# Patient Record
Sex: MALE
[De-identification: names, ages, dates, MRNs, and addresses within clinical notes are randomized per-mention and may not be internally consistent; named-entity substitution may affect disease eponyms.]

---

## 2018-04-30 ENCOUNTER — HOSPITAL ENCOUNTER (INPATIENT)
Dept: HOSPITAL 31 - C.ER | Age: 83
LOS: 7 days | Discharge: NURSING, REHAB OR LONG-TERM CARE | DRG: 293 | End: 2018-05-07
Attending: INTERNAL MEDICINE | Admitting: INTERNAL MEDICINE
Payer: COMMERCIAL

## 2018-04-30 DIAGNOSIS — I25.10: ICD-10-CM

## 2018-04-30 DIAGNOSIS — I27.20: ICD-10-CM

## 2018-04-30 DIAGNOSIS — I42.0: ICD-10-CM

## 2018-04-30 DIAGNOSIS — I34.0: ICD-10-CM

## 2018-04-30 DIAGNOSIS — I50.43: ICD-10-CM

## 2018-04-30 DIAGNOSIS — I35.1: ICD-10-CM

## 2018-04-30 DIAGNOSIS — E11.9: ICD-10-CM

## 2018-04-30 DIAGNOSIS — Z95.810: ICD-10-CM

## 2018-04-30 DIAGNOSIS — I11.0: Primary | ICD-10-CM

## 2018-04-30 LAB
ALBUMIN SERPL-MCNC: 3.8 G/DL (ref 3.5–5)
ALBUMIN/GLOB SERPL: 0.9 {RATIO} (ref 1–2.1)
ALT SERPL-CCNC: 7 U/L (ref 21–72)
APTT BLD: 56 SECONDS (ref 21–34)
AST SERPL-CCNC: 25 U/L (ref 17–59)
BASOPHILS # BLD AUTO: 0 K/UL (ref 0–0.2)
BASOPHILS NFR BLD: 0.5 % (ref 0–2)
BNP SERPL-MCNC: (no result) PG/ML (ref 0–900)
BUN SERPL-MCNC: 27 MG/DL (ref 9–20)
CALCIUM SERPL-MCNC: 9.6 MG/DL (ref 8.6–10.4)
CK MB SERPL-MCNC: 0.87 NG/ML (ref 0–3.38)
EOSINOPHIL # BLD AUTO: 0.2 K/UL (ref 0–0.7)
EOSINOPHIL NFR BLD: 2.5 % (ref 0–4)
ERYTHROCYTE [DISTWIDTH] IN BLOOD BY AUTOMATED COUNT: 13.4 % (ref 11.5–14.5)
GFR NON-AFRICAN AMERICAN: 58
HGB BLD-MCNC: 10.9 G/DL (ref 12–18)
INR PPP: 1.5
LYMPHOCYTES # BLD AUTO: 1.1 K/UL (ref 1–4.3)
LYMPHOCYTES NFR BLD AUTO: 11.9 % (ref 20–40)
MCH RBC QN AUTO: 31.4 PG (ref 27–31)
MCHC RBC AUTO-ENTMCNC: 33.9 G/DL (ref 33–37)
MCV RBC AUTO: 92.6 FL (ref 80–94)
MONOCYTES # BLD: 1.2 K/UL (ref 0–0.8)
MONOCYTES NFR BLD: 13.3 % (ref 0–10)
NEUTROPHILS # BLD: 6.7 K/UL (ref 1.8–7)
NEUTROPHILS NFR BLD AUTO: 71.8 % (ref 50–75)
NRBC BLD AUTO-RTO: 0 % (ref 0–2)
PLATELET # BLD: 310 K/UL (ref 130–400)
PMV BLD AUTO: 9.8 FL (ref 7.2–11.7)
PROTHROMBIN TIME: 17.9 SECONDS (ref 9.7–12.2)
RBC # BLD AUTO: 3.47 MIL/UL (ref 4.4–5.9)
TROPONIN I SERPL-MCNC: 0.02 NG/ML (ref 0–0.12)
WBC # BLD AUTO: 9.3 K/UL (ref 4.8–10.8)

## 2018-04-30 RX ADMIN — IPRATROPIUM BROMIDE AND ALBUTEROL SULFATE SCH ML: .5; 3 SOLUTION RESPIRATORY (INHALATION) at 21:01

## 2018-04-30 NOTE — RAD
PROCEDURE:  CHEST RADIOGRAPH, 1 VIEW



HISTORY:

SOB



COMPARISON:

None available.



FINDINGS:



LUNGS:

There appears be mild cephalization; rule out developing pulmonary 

edema/CHF. .  Mild bibasilar atelectasis left greater than right. 



PLEURA:

No pneumothorax or pleural fluid seen.



CARDIOVASCULAR:

Cardiomegaly. In situ bipolar pacemaker.



OSSEOUS STRUCTURES:

No significant abnormalities.



VISUALIZED UPPER ABDOMEN:

Normal.



OTHER FINDINGS:

None. 



IMPRESSION:

Mild cephalization; rule out developing pulmonary edema/ CHF.  Mild 

bibasilar atelectasis left greater than right.  Cardiomegaly.

## 2018-04-30 NOTE — CP.PCM.CON
History of Present Illness





- History of Present Illness


History of Present Illness: 





   


84-year-old male with history of cardiomyopathy, CHF, hypertension, diabetes, 

gout admitted for shortness of breath. Patient  was seen in the office on 

Friday with chief complaint  of shortness of breath which is mostly on exertion 

and has progressively gone worse over the 5 weeks time. denies cough, denies 

chest pain, denies fever chills. Patient also states that he has trouble 

sleeping at night and mostly sleep sitting on the chair and feel very tired and 

sleepy during the daytime and take multiple naps. Also complaining of swelling 

of the legs. No heartburn or indigestion





Review of Systems





- Review of Systems


All systems: reviewed and no additional remarkable complaints except (shortness 

of breath and leg swelling)





Past Patient History





- Past Medical History & Family History


Past Medical History?: Yes





- Past Social History


Smoking Status: Never Smoked





- CARDIAC


Hx Hypertension: Yes





- MUSCULOSKELETAL/RHEUMATOLOGICAL


Hx Falls: No





- PSYCHIATRIC


Hx Substance Use: No





- ANESTHESIA


Hx Anesthesia: Yes


Hx Anesthesia Reactions: No





Meds


Allergies/Adverse Reactions: 


 Allergies











Allergy/AdvReac Type Severity Reaction Status Date / Time


 


No Known Allergies Allergy   Verified 04/30/18 11:04














- Medications


Medications: 


 Current Medications





Albuterol/Ipratropium (Duoneb 3 Mg/0.5 Mg (3 Ml) Ud)  3 ml INH RQ6 [facility]


Aspirin (Aspirin Chewable)  81 mg PO DAILY [facility]


Dabigatran (Pradaxa)  75 mg PO DAILY [facility]


Famotidine (Pepcid)  20 mg PO DAILY [facility]


Furosemide (Lasix)  40 mg IVP Q12 [facility]


Isosorbide Mononitrate (Imdur Er)  30 mg PO DAILY [facility]


Montelukast Sodium (Singulair)  10 mg PO HS [facility]











Physical Exam





- Head Exam


Head Exam: ATRAUMATIC, NORMOCEPHALIC





- Eye Exam


Eye Exam: Normal appearance





- ENT Exam


ENT Exam: Mucous Membranes Moist





- Neck Exam


Neck exam: Positive for: Normal Inspection





- Respiratory Exam


Respiratory Exam: Rales





- Cardiovascular Exam


Cardiovascular Exam: REGULAR RHYTHM





- GI/Abdominal Exam


GI & Abdominal Exam: Normal Bowel Sounds, Soft





- Extremities Exam


Extremities exam: Positive for: pedal edema





- Neurological Exam


Neurological exam: Alert, Oriented x3





Results





- Vital Signs


Recent Vital Signs: 


 Last Vital Signs











Temp  97.4 F L  04/30/18 17:12


 


Pulse  67   04/30/18 17:12


 


Resp  20   04/30/18 17:12


 


BP  100/64   04/30/18 17:12


 


Pulse Ox  99   04/30/18 17:55














- Labs


Result Diagrams: 


 04/30/18 12:38





 04/30/18 12:38


Labs: 


 Laboratory Results - last 24 hr











  04/30/18 04/30/18 04/30/18





  12:38 12:38 12:38


 


WBC  9.3  


 


RBC  3.47 L  


 


Hgb  10.9 L  


 


Hct  32.1 L  


 


MCV  92.6  


 


MCH  31.4 H  


 


MCHC  33.9  


 


RDW  13.4  


 


Plt Count  310  


 


MPV  9.8  


 


Neut % (Auto)  71.8  


 


Lymph % (Auto)  11.9 L  


 


Mono % (Auto)  13.3 H  


 


Eos % (Auto)  2.5  


 


Baso % (Auto)  0.5  


 


Neut # (Auto)  6.7  


 


Lymph # (Auto)  1.1  


 


Mono # (Auto)  1.2 H  


 


Eos # (Auto)  0.2  


 


Baso # (Auto)  0.0  


 


PT    17.9 H


 


INR    1.5


 


APTT    56 H


 


Sodium   143 


 


Potassium   3.9 


 


Chloride   99 


 


Carbon Dioxide   30 


 


Anion Gap   18 


 


BUN   27 H 


 


Creatinine   1.2 


 


Est GFR ( Amer)   > 60 


 


Est GFR (Non-Af Amer)   58 


 


Random Glucose   114 H 


 


Calcium   9.6 


 


Total Bilirubin   0.9 


 


AST   25 


 


ALT   7 L 


 


Alkaline Phosphatase   83 


 


Troponin I   0.0190 


 


NT-Pro-B Natriuret Pep   10271 H 


 


Total Protein   8.1 


 


Albumin   3.8 


 


Globulin   4.3 H 


 


Albumin/Globulin Ratio   0.9 L 














Assessment & Plan


(1) Dyspnea


Status: Acute   


Comment: most likely secondary to congestive heart  failure.  Echocardiogram.  

IV Lasix.  Cardiology evaluation   





(2) CHF (congestive heart failure)


Status: Acute

## 2018-04-30 NOTE — CP.PCM.HP
History of Present Illness





- History of Present Illness


History of Present Illness: 





Chief complaint:


Shortness of breath.





History present illness:


84-year-old male with history of hypertension, pacemaker placement came to the 

emergency room after was sent by pulmonologist.


Patient was having increasing gradually shortness of breath, also gradual leg 

swelling.


2 weeks his symptoms got worse.


He started having increasing leg swelling, increasing SOB.


He denies any chest pain.


He was not able to lie flat.


His urinary frequency also decreasing.


He cannot able to complete a sentence.


He was having increasing weakness and tiredness and easy fatigability.








PMD: 





Present on Admission





- Present on Admission


Any Indicators Present on Admission: No


History of DVT/PE: No


History of Uncontrolled Diabetes: No


Urinary Catheter: No


Decubitus Ulcer Present: No





Review of Systems





- Review of Systems


Systems not reviewed;Unavailable: Respiratory Distress





- Constitutional


Constitutional: Anorexia, Weakness





- Cardiovascular


Cardiovascular: Dyspnea on Exertion, Edema, Orthopnea, Paroxysmal Nocturnal 

Dyspnea, Pedal Edema





- Respiratory


Respiratory: Dyspnea on Exertion, Wheezing





- Gastrointestinal


Gastrointestinal: Change in Stool Character





- Genitourinary


Genitourinary: Dysuria





- Neurological


Neurological: As Per HPI





Past Patient History





- Past Medical History & Family History


Past Medical History?: Yes


Pertinent Family History: 





No significant family history of heart disease.





- Past Social History


Smoking Status: Never Smoked


Chewing Tobacco Use: No


Cigar Use: No


Alcohol: None


Drugs: Denies


Home Situation {Lives}: With Family


Domestic Violence: Negative





- CARDIAC


Hx Hypertension: Yes (Pacemaker)





- MUSCULOSKELETAL/RHEUMATOLOGICAL


Hx Falls: No





- PSYCHIATRIC


Hx Substance Use: No





- ANESTHESIA


Hx Anesthesia: Yes


Hx Anesthesia Reactions: No





Meds


Allergies/Adverse Reactions: 


 Allergies











Allergy/AdvReac Type Severity Reaction Status Date / Time


 


No Known Allergies Allergy   Verified 04/30/18 11:04














Physical Exam





- Constitutional


Appears: In Acute Distress





- Head Exam


Head Exam: NORMAL INSPECTION





- Eye Exam


Pupil Exam: NORMAL ACCOMODATION





- ENT Exam


ENT Exam: Mucous Membranes Moist





- Neck Exam


Neck exam: Positive for: Normal Inspection





- Respiratory Exam


Respiratory Exam: Accessory Muscle Use





- Cardiovascular Exam


Cardiovascular Exam: Tachycardia, REGULAR RHYTHM





- GI/Abdominal Exam


GI & Abdominal Exam: Normal Bowel Sounds





Results





- Vital Signs


Recent Vital Signs: 





 Last Vital Signs











Temp  97.4 F L  04/30/18 17:12


 


Pulse  70   04/30/18 21:04


 


Resp  20   04/30/18 17:12


 


BP  100/64   04/30/18 17:12


 


Pulse Ox  99   04/30/18 17:55














- Labs


Result Diagrams: 


 04/30/18 12:38





 04/30/18 12:38


Labs: 





 Laboratory Results - last 24 hr











  04/30/18 04/30/18 04/30/18





  12:38 12:38 12:38


 


WBC  9.3  


 


RBC  3.47 L  


 


Hgb  10.9 L  


 


Hct  32.1 L  


 


MCV  92.6  


 


MCH  31.4 H  


 


MCHC  33.9  


 


RDW  13.4  


 


Plt Count  310  


 


MPV  9.8  


 


Neut % (Auto)  71.8  


 


Lymph % (Auto)  11.9 L  


 


Mono % (Auto)  13.3 H  


 


Eos % (Auto)  2.5  


 


Baso % (Auto)  0.5  


 


Neut # (Auto)  6.7  


 


Lymph # (Auto)  1.1  


 


Mono # (Auto)  1.2 H  


 


Eos # (Auto)  0.2  


 


Baso # (Auto)  0.0  


 


PT    17.9 H


 


INR    1.5


 


APTT    56 H


 


Sodium   143 


 


Potassium   3.9 


 


Chloride   99 


 


Carbon Dioxide   30 


 


Anion Gap   18 


 


BUN   27 H 


 


Creatinine   1.2 


 


Est GFR ( Amer)   > 60 


 


Est GFR (Non-Af Amer)   58 


 


Random Glucose   114 H 


 


Calcium   9.6 


 


Total Bilirubin   0.9 


 


AST   25 


 


ALT   7 L 


 


Alkaline Phosphatase   83 


 


Total Creatine Kinase   


 


CK-MB (Mass)   


 


Troponin I   0.0190 


 


NT-Pro-B Natriuret Pep   57469 H 


 


Total Protein   8.1 


 


Albumin   3.8 


 


Globulin   4.3 H 


 


Albumin/Globulin Ratio   0.9 L 














  04/30/18





  19:32


 


WBC 


 


RBC 


 


Hgb 


 


Hct 


 


MCV 


 


MCH 


 


MCHC 


 


RDW 


 


Plt Count 


 


MPV 


 


Neut % (Auto) 


 


Lymph % (Auto) 


 


Mono % (Auto) 


 


Eos % (Auto) 


 


Baso % (Auto) 


 


Neut # (Auto) 


 


Lymph # (Auto) 


 


Mono # (Auto) 


 


Eos # (Auto) 


 


Baso # (Auto) 


 


PT 


 


INR 


 


APTT 


 


Sodium 


 


Potassium 


 


Chloride 


 


Carbon Dioxide 


 


Anion Gap 


 


BUN 


 


Creatinine 


 


Est GFR ( Amer) 


 


Est GFR (Non-Af Amer) 


 


Random Glucose 


 


Calcium 


 


Total Bilirubin 


 


AST 


 


ALT 


 


Alkaline Phosphatase 


 


Total Creatine Kinase  23 L


 


CK-MB (Mass)  0.87


 


Troponin I  0.0240


 


NT-Pro-B Natriuret Pep 


 


Total Protein 


 


Albumin 


 


Globulin 


 


Albumin/Globulin Ratio 














Assessment & Plan


(1) Systolic heart failure secondary to hypertension


Assessment and Plan: 


Patient most likely has severe heart, cardiomyopathy, associate with the 

decompensated heart failure.


Intravenous Lasix.


Bronchodilators.


ACE inhibitor as


Currently on anticoagulation.


Cardiology evaluation


Status: Acute   





(2) Hypertension


Status: Acute

## 2018-04-30 NOTE — C.PDOC
History Of Present Illness


84-year-old male, presents to the emergency department with complaints of 

shortness of breath that has been progressively worsening over the past 6-7 

weeks. Patient was seen by Dr Grace, and instructed to come to ED for further 

evaluation/ Denies nausea/vomiting, fever or chills. 


Time Seen by Provider: 18 12:10


Chief Complaint (Nursing): Shortness Of Breath


History Per: Patient


History/Exam Limitations: no limitations


Onset/Duration Of Symptoms: Days


Current Symptoms Are (Timing): Still Present





Past Medical History


Reviewed: Historical Data, Nursing Documentation, Vital Signs


Vital Signs: 


 Last Vital Signs











Temp  97.4 F L  18 17:12


 


Pulse  67   18 17:12


 


Resp  20   18 17:12


 


BP  100/64   18 17:12


 


Pulse Ox  97   18 17:12














- Medical History


PMH: HTN


Family History: States: No Known Family Hx





- Social History


Hx Alcohol Use: No


Hx Substance Use: No





- Immunization History


Hx Tetanus Toxoid Vaccination: No


Hx Influenza Vaccination: No


Hx Pneumococcal Vaccination: No





Review Of Systems


Cardiovascular: Negative for: Chest Pain


Respiratory: Positive for: Shortness of Breath


Musculoskeletal: Positive for: Other (leg swelling)


Neurological: Negative for: Weakness, Numbness





Physical Exam





- Physical Exam


Appears: Non-toxic, No Acute Distress


Skin: Normal Color, Warm, Dry, No Rash


Head: Normacephalic


Eye(s): bilateral: PERRL


Nose: Normal


Oral Mucosa: Moist


Lips: Normal Appearing


Neck: Normal ROM


Cardiovascular: Rhythm Regular, No Murmur


Respiratory: No Accessory Muscle Use, Rales (B/L)


Gastrointestinal/Abdominal: Normal Exam, Bowel Sounds, Soft


Extremity: Normal ROM, Pedal Edema (B/L, pitting 3+), No Deformity


Neurological/Psych: Oriented x3, Normal Speech





ED Course And Treatment





- Laboratory Results


Result Diagrams: 


 18 12:38





 18 12:38


ECG: Interpreted By Me, Viewed By Me


ECG Rhythm: V Paced


Rate From EC


O2 Sat by Pulse Oximetry: 99 (RA)


Pulse Ox Interpretation: Normal





Medical Decision Making


Medical Decision Making: 


Plan:


* EKG


* Labs


* Chest X-Ray


* Lasix, Aspirin


* Reassess and Disposition





Case discussed with Dr escoto, will admit patient to tele for CHF


Dr Mary aware that patient is Dr Grace's, will admit under his service





Disposition


Discussed With : Ryann Escoto


Doctor Will See Patient In The: ED


Counseled Patient/Family Regarding: Studies Performed, Diagnosis





- Disposition


Disposition: HOSPITALIZED


Disposition Time: 13:10


Condition: FAIR





- Clinical Impression


Clinical Impression: 


 CHF (congestive heart failure)








- Scribe Statement


The provider has reviewed the documentation as recorded by the Scribe (Porfirio Christiansen)


All medical record entries made by the Scribe were at my direction and 

personally dictated by me. I have reviewed the chart and agree that the record 

accurately reflects my personal performance of the history, physical exam, 

medical decision making, and the department course for this patient. I have 

also personally directed, reviewed, and agree with the discharge instructions 

and disposition.

## 2018-05-01 LAB
ARTERIAL BLOOD GAS HEMOGLOBIN: 11.4 G/DL (ref 11.7–17.4)
ARTERIAL BLOOD GAS O2 SAT: 99 % (ref 95–98)
ARTERIAL BLOOD GAS PCO2: 38 MM/HG (ref 35–45)
ARTERIAL BLOOD GAS TCO2: 31.5 MMOL/L (ref 22–28)
ARTERIAL PATENCY WRIST A: (no result)
CK MB SERPL-MCNC: 0.65 NG/ML (ref 0–3.38)
HCO3 BLDA-SCNC: 30.2 MMOL/L (ref 21–28)
INHALED O2 CONCENTRATION: 21 %
PH BLDA: 7.51 [PH] (ref 7.35–7.45)
PO2 BLDA: 87 MM/HG (ref 80–100)
TROPONIN I SERPL-MCNC: 0.03 NG/ML (ref 0–0.12)

## 2018-05-01 RX ADMIN — IPRATROPIUM BROMIDE AND ALBUTEROL SULFATE SCH ML: .5; 3 SOLUTION RESPIRATORY (INHALATION) at 13:13

## 2018-05-01 RX ADMIN — IPRATROPIUM BROMIDE AND ALBUTEROL SULFATE SCH ML: .5; 3 SOLUTION RESPIRATORY (INHALATION) at 01:22

## 2018-05-01 RX ADMIN — IPRATROPIUM BROMIDE AND ALBUTEROL SULFATE SCH ML: .5; 3 SOLUTION RESPIRATORY (INHALATION) at 08:49

## 2018-05-01 RX ADMIN — IPRATROPIUM BROMIDE AND ALBUTEROL SULFATE SCH ML: .5; 3 SOLUTION RESPIRATORY (INHALATION) at 20:07

## 2018-05-01 NOTE — CP.PCM.PN
Subjective





- Date & Time of Evaluation


Date of Evaluation: 05/01/18


Time of Evaluation: 19:48





- Subjective


Subjective: 





pt is feeling better


sitting up


eating better


still sob





spoke to cardiology


feeling ok





echo showing severe low ef and systolic heart failure


BP low


hard to diuresis fully


will optimize meds


may need home o2





Objective





- Vital Signs/Intake and Output


Vital Signs (last 24 hours): 


 











Temp Pulse Resp BP Pulse Ox


 


 97.3 F L  78   20   95/57 L  95 


 


 05/01/18 15:00  05/01/18 16:00  05/01/18 15:00  05/01/18 15:00  05/01/18 15:00











- Medications


Medications: 


 Current Medications





Albuterol/Ipratropium (Duoneb 3 Mg/0.5 Mg (3 Ml) Ud)  3 ml INH RQ6 [facility]


   Last Admin: 05/01/18 13:13 Dose:  3 ml


Aspirin (Aspirin Chewable)  81 mg PO DAILY [facility]


   Last Admin: 05/01/18 10:56 Dose:  81 mg


Dabigatran (Pradaxa)  75 mg PO DAILY [facility]


   Last Admin: 05/01/18 11:17 Dose:  75 mg


Famotidine (Pepcid)  20 mg PO DAILY [facility]


   Last Admin: 05/01/18 10:56 Dose:  20 mg


Furosemide (Lasix)  40 mg IVP Q12 [facility]


   Last Admin: 05/01/18 10:56 Dose:  40 mg


Isosorbide Mononitrate (Imdur Er)  30 mg PO DAILY [facility]


   Last Admin: 05/01/18 10:57 Dose:  30 mg


Montelukast Sodium (Singulair)  10 mg PO HS [facility]


   Last Admin: 04/30/18 21:31 Dose:  10 mg











- Labs


Labs: 


 





 04/30/18 12:38 





 04/30/18 12:38 





 











PT  17.9 SECONDS (9.7-12.2)  H  04/30/18  12:38    


 


INR  1.5   04/30/18  12:38    


 


APTT  56 SECONDS (21-34)  H  04/30/18  12:38    














Assessment and Plan


(1) Systolic heart failure secondary to hypertension


Status: Acute   





(2) Hypertension


Status: Acute

## 2018-05-01 NOTE — CARD
--------------- APPROVED REPORT --------------





EKG Measurement

Heart [id number]

[id number]

[id number]

[id number]

[id number]



<Conclusion>

Atrial-sensed ventricular-paced rhythm with prolonged AV conduction

Abnormal ECG

## 2018-05-01 NOTE — CARD
--------------- APPROVED REPORT --------------





EXAM: Two-dimensional and M-mode echocardiogram with Doppler, color 

Doppler with contrast.



Other Information 

Quality : GoodRhythm : 



INDICATION

Congestive Heart Failure 



Echo Enhancing Agent

Indication: LV Mass

Agent/Amount Used: Definity



2D DIMENSIONS 

IVSd0.7   (0.7-1.1cm)LVDd6.5   (3.9-5.9cm)

PWd0.7   (0.7-1.1cm)LVDs5.6   (2.5-4.0cm)

FS (%) 13.5   %LVEF (%)28.3   (>50%)



M-Mode DIMENSIONS 

RVDd2.40   (2.1-3.2cm)Left Atrium (MM)4.14   (2.5-4.0cm)

IVSd0.74   (0.7-1.1cm)Aortic Root3.51   (2.2-3.7cm)

LVDd7.23   (4.0-5.6cm)Aortic Cusp Exc.1.74   (1.5-2.0cm)

PWd1.00   (0.7-1.1cm)FS (%) 7   %

LVDs6.71   (2.0-3.8cm)LVEF (%)22   (>50%)



Aortic Valve

AI P 1/2 [id number]



Mitral Valve

MV E Wullpzqr029.9cm/sE/A ratio0.0



TDI

E/Lateral E'0.0E/Medial E'0.0



Tricuspid Valve

TR Peak [id number]/sTR Peak [id number]



 LEFT VENTRICLE 

The Left Ventricle is moderately to severely dilated.

There  mild concentric left ventricular hypertrophy.

The systolic function is severely impaired.

The Ejection Fraction is - 25%

There is global hypokinesis of the left ventricle.

There is a flattened septum consistent with right ventricle pressure 

overload.

Transmitral Doppler flow pattern is Grade III - restrictive diastolic 

dysfunction.

Tissue Doppler compatible with elevated left atrial pressure.



 RIGHT VENTRICLE 

The right ventricle is  moderately dilated.

Right ventricular Systolic function is moderately reduced.

There is a pacemaker lead in the right ventricle.



 ATRIA 

The left atrium is moderately dilated.

The right atrium is moderately dilated.

There is a pacemaker lead seen in the right atrium.



 AORTIC VALVE 

The aortic valve is normal in structure.

There is mild aortic regurgitation.



 MITRAL VALVE 

The mitral valve is normal in structure.

Mitral regurgitation is moderate.



 TRICUSPID VALVE 

The tricuspid valve is normal in structure.

There is moderate to severe tricuspid regurgitation.

Right ventricular systolic pressure is estimated at greater than 60 

mmHg. 

There is severe pulmonary hypertension.



 PULMONIC VALVE 

The pulmonary valve is normal in structure.

There is mild pulmonic valvular regurgitation.



 GREAT VESSELS 

The aortic root is normal in size.

Dilated IVC with poor inspiration collapse is consistent with 

elevated right atrial pressure.



 PERICARDIAL EFFUSION 

There is no pericardial effusion.



<Conclusion>

Moderate to severe dilatation of all cardiac chambers

There is  mild concentric left ventricular hypertrophy with global 

hypokinesis.

Severe systolic and diastolic left ventricular dysfunction.

Right ventricular Systolic function is moderately reduced. There is a 

flattened septum consistent with right ventricle pressure overload.

Pacemaker leads seen in right heart.

There is mild aortic regurgitation.

Mitral regurgitation is moderate.

There is moderate to severe tricuspid regurgitation. 

Right ventricular systolic pressure is estimated at greater than 60 

mmHg compatible with severe pulmonary hypertension.

There is no pericardial effusion.

Contrast enhanced imaging again demonstrates severe global 

hypokinesis. No gross evidence of left ventricular thrombus.

## 2018-05-02 LAB
ALBUMIN SERPL-MCNC: 3.5 G/DL (ref 3.5–5)
ALBUMIN/GLOB SERPL: 0.9 {RATIO} (ref 1–2.1)
ALT SERPL-CCNC: 13 U/L (ref 21–72)
AST SERPL-CCNC: 30 U/L (ref 17–59)
BASOPHILS # BLD AUTO: 0 K/UL (ref 0–0.2)
BASOPHILS NFR BLD: 0.6 % (ref 0–2)
BUN SERPL-MCNC: 26 MG/DL (ref 9–20)
CALCIUM SERPL-MCNC: 9.6 MG/DL (ref 8.6–10.4)
EOSINOPHIL # BLD AUTO: 0.2 K/UL (ref 0–0.7)
EOSINOPHIL NFR BLD: 2.6 % (ref 0–4)
ERYTHROCYTE [DISTWIDTH] IN BLOOD BY AUTOMATED COUNT: 13.5 % (ref 11.5–14.5)
GFR NON-AFRICAN AMERICAN: 58
HGB BLD-MCNC: 11.3 G/DL (ref 12–18)
LYMPHOCYTES # BLD AUTO: 1 K/UL (ref 1–4.3)
LYMPHOCYTES NFR BLD AUTO: 11.8 % (ref 20–40)
MCH RBC QN AUTO: 31.8 PG (ref 27–31)
MCHC RBC AUTO-ENTMCNC: 34.6 G/DL (ref 33–37)
MCV RBC AUTO: 91.9 FL (ref 80–94)
MONOCYTES # BLD: 1.2 K/UL (ref 0–0.8)
MONOCYTES NFR BLD: 13.6 % (ref 0–10)
NEUTROPHILS # BLD: 6.1 K/UL (ref 1.8–7)
NEUTROPHILS NFR BLD AUTO: 71.4 % (ref 50–75)
NRBC BLD AUTO-RTO: 0.1 % (ref 0–2)
PLATELET # BLD: 291 K/UL (ref 130–400)
PMV BLD AUTO: 9.5 FL (ref 7.2–11.7)
RBC # BLD AUTO: 3.57 MIL/UL (ref 4.4–5.9)
WBC # BLD AUTO: 8.6 K/UL (ref 4.8–10.8)

## 2018-05-02 RX ADMIN — IPRATROPIUM BROMIDE AND ALBUTEROL SULFATE SCH ML: .5; 3 SOLUTION RESPIRATORY (INHALATION) at 01:24

## 2018-05-02 RX ADMIN — IPRATROPIUM BROMIDE AND ALBUTEROL SULFATE SCH ML: .5; 3 SOLUTION RESPIRATORY (INHALATION) at 19:54

## 2018-05-02 RX ADMIN — IPRATROPIUM BROMIDE AND ALBUTEROL SULFATE SCH ML: .5; 3 SOLUTION RESPIRATORY (INHALATION) at 13:42

## 2018-05-02 RX ADMIN — IPRATROPIUM BROMIDE AND ALBUTEROL SULFATE SCH ML: .5; 3 SOLUTION RESPIRATORY (INHALATION) at 07:16

## 2018-05-02 NOTE — CP.PCM.PN
<Shakeel Griffiths - Last Filed: 05/02/18 19:09>





Subjective





- Date & Time of Evaluation


Date of Evaluation: 05/02/18


Time of Evaluation: 09:36





- Subjective


Subjective: 





PGY-2 note for Dr. Vazquez's cardiology service:





Pt seen and examined at bedside. Nursing reports no acute events overnight. Pt 

found sitting at bedside receiving breathing treatment. He is able to talk in 

full sentences. 





Objective





- Vital Signs/Intake and Output


Vital Signs (last 24 hours): 


 











Temp Pulse Resp BP Pulse Ox


 


 98.4 F   95 H  20   111/60   94 L


 


 05/02/18 08:36  05/02/18 08:36  05/02/18 08:36  05/02/18 09:20  05/02/18 08:36








Intake and Output: 


 











 05/02/18 05/02/18





 06:59 18:59


 


Intake Total 320 


 


Output Total 600 


 


Balance -280 














- Medications


Medications: 


 Current Medications





Albuterol/Ipratropium (Duoneb 3 Mg/0.5 Mg (3 Ml) Ud)  3 ml INH RQ6 [facility]


   Last Admin: 05/02/18 07:16 Dose:  3 ml


Aspirin (Aspirin Chewable)  81 mg PO DAILY [facility]


   Last Admin: 05/02/18 09:19 Dose:  81 mg


Dabigatran (Pradaxa)  75 mg PO DAILY [facility]


   Last Admin: 05/02/18 09:19 Dose:  75 mg


Diphenhydramine HCl (Benadryl)  25 mg PO HS PRN


   PRN Reason: Insomnia


Famotidine (Pepcid)  20 mg PO DAILY [facility]


   Last Admin: 05/02/18 09:19 Dose:  20 mg


Furosemide (Lasix)  40 mg IVP DAILY [facility]


   Last Admin: 05/02/18 09:20 Dose:  40 mg


Isosorbide Mononitrate (Imdur Er)  30 mg PO DAILY [facility]


   Last Admin: 05/02/18 09:19 Dose:  30 mg











- Labs


Labs: 


 





 05/02/18 07:03 





 05/02/18 07:03 





 











PT  17.9 SECONDS (9.7-12.2)  H  04/30/18  12:38    


 


INR  1.5   04/30/18  12:38    


 


APTT  56 SECONDS (21-34)  H  04/30/18  12:38    














Assessment and Plan





- Assessment and Plan (Free Text)


Plan: 





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


Increasing SOB, leg edema


Admit to tele


Trop negative x 3


ECHO (4/30/18): EF 28%, LV severely dilated, mild LVH, global hypokinesis, 

Grade III restrictive diastolic dysfunction, flattened septum with elevated 

atrial pressure, moderate to severe dilatation of all cardiac chambers. 

Moderate to severe tricuspid regurgitation. No LV thrombus.


EKG (5/1/18): HR 70, Ventricular paced rhythm, with prolonged QTc.





Lasix 40mg IV daily


Pradaxa 75mg PO Daily





Dilated cardiomyopathy


ECHO (4/30/18): EF 28%, Moderate to severe dilatation of all cardiac chambers. 

No LV thrombus.


ASA 81mg PO Daily


Pradaxa 75mg PO Daily





Severe pulm HTN


ECHO (4/30/18): EF 28%, LV severely dilated, mild LVH, global hypokinesis, 

Grade III restrictive diastolic dysfunction, flattened septum with elevated 

atrial pressure, moderate to severe dilatation of all cardiac chambers. 

Moderate to severe tricuspid regurgitation. No LV thrombus.





HTN


well-controlled


Lasix 40mg IV Daily


Imdur 30mg PO Daily





Hx of Pacemaker placement








Shakeel Griffiths PGY-2


D/w Dr. Vazquez





<Familia Vazquez - Last Filed: 05/02/18 23:40>





Objective





- Vital Signs/Intake and Output


Vital Signs (last 24 hours): 


 











Temp Pulse Resp BP Pulse Ox


 


 97.9 F   75   20   100/59 L  95 


 


 05/02/18 15:50  05/02/18 16:00  05/02/18 15:50  05/02/18 15:50  05/02/18 15:50








Intake and Output: 


 











 05/02/18 05/03/18





 18:59 06:59


 


Intake Total  320


 


Output Total  400


 


Balance  -80














- Medications


Medications: 


 Current Medications





Albuterol/Ipratropium (Duoneb 3 Mg/0.5 Mg (3 Ml) Ud)  3 ml INH RQ6 [facility]


   Last Admin: 05/02/18 19:54 Dose:  3 ml


Aspirin (Aspirin Chewable)  81 mg PO DAILY [facility]


   Last Admin: 05/02/18 09:19 Dose:  81 mg


Dabigatran (Pradaxa)  75 mg PO DAILY [facility]


   Last Admin: 05/02/18 09:19 Dose:  75 mg


Diphenhydramine HCl (Benadryl)  25 mg PO HS PRN


   PRN Reason: Insomnia


   Last Admin: 05/02/18 22:28 Dose:  25 mg


Famotidine (Pepcid)  20 mg PO DAILY [facility]


   Last Admin: 05/02/18 09:19 Dose:  20 mg


Furosemide (Lasix)  20 mg IVP Q12 [facility]


Isosorbide Mononitrate (Imdur Er)  30 mg PO DAILY [facility]


   Last Admin: 05/02/18 09:19 Dose:  30 mg











- Labs


Labs: 


 





 05/02/18 07:03 





 05/02/18 07:03 





 











PT  17.9 SECONDS (9.7-12.2)  H  04/30/18  12:38    


 


INR  1.5   04/30/18  12:38    


 


APTT  56 SECONDS (21-34)  H  04/30/18  12:38    














Assessment and Plan





- Assessment and Plan (Free Text)


Plan: 


Patient seen and evaluated personally by me


Plan of care d/w the medical resident and as documented

## 2018-05-02 NOTE — CP.PCM.PN
Subjective





- Date & Time of Evaluation


Date of Evaluation: 05/02/18


Time of Evaluation: 22:44





- Subjective


Subjective: 





pt is feeling well


still feeling sob


leg swelling noted


will f/u





add lasix 20mg  bid








Objective





- Vital Signs/Intake and Output


Vital Signs (last 24 hours): 


 











Temp Pulse Resp BP Pulse Ox


 


 97.9 F   75   20   100/59 L  95 


 


 05/02/18 15:50  05/02/18 16:00  05/02/18 15:50  05/02/18 15:50  05/02/18 15:50








Intake and Output: 


 











 05/02/18 05/03/18





 18:59 06:59


 


Intake Total  320


 


Output Total  400


 


Balance  -80














- Medications


Medications: 


 Current Medications





Albuterol/Ipratropium (Duoneb 3 Mg/0.5 Mg (3 Ml) Ud)  3 ml INH RQ6 [facility]


   Last Admin: 05/02/18 19:54 Dose:  3 ml


Aspirin (Aspirin Chewable)  81 mg PO DAILY [facility]


   Last Admin: 05/02/18 09:19 Dose:  81 mg


Dabigatran (Pradaxa)  75 mg PO DAILY [facility]


   Last Admin: 05/02/18 09:19 Dose:  75 mg


Diphenhydramine HCl (Benadryl)  25 mg PO HS PRN


   PRN Reason: Insomnia


   Last Admin: 05/02/18 22:28 Dose:  25 mg


Famotidine (Pepcid)  20 mg PO DAILY [facility]


   Last Admin: 05/02/18 09:19 Dose:  20 mg


Furosemide (Lasix)  40 mg IVP DAILY [facility]


   Last Admin: 05/02/18 09:20 Dose:  40 mg


Isosorbide Mononitrate (Imdur Er)  30 mg PO DAILY [facility]


   Last Admin: 05/02/18 09:19 Dose:  30 mg











- Labs


Labs: 


 





 05/02/18 07:03 





 05/02/18 07:03 





 











PT  17.9 SECONDS (9.7-12.2)  H  04/30/18  12:38    


 


INR  1.5   04/30/18  12:38    


 


APTT  56 SECONDS (21-34)  H  04/30/18  12:38    














Assessment and Plan


(1) Systolic heart failure secondary to hypertension


Status: Acute   





(2) Hypertension


Status: Acute

## 2018-05-02 NOTE — CP.PCM.CON
History of Present Illness





- History of Present Illness


History of Present Illness: 





Patient seen and evaluated


Admitted for Acute on Chronic systolic CHF





Continue diueresis


Will otpimize CHF management


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





Past Patient History





- Past Medical History & Family History


Past Medical History?: Yes





- Past Social History


Smoking Status: Never Smoked


Chewing Tobacco Use: No


Cigar Use: No


Alcohol: None


Drugs: Denies


Home Situation {Lives}: With Family


Domestic Violence: Negative





- CARDIAC


Hx Hypertension: Yes (Pacemaker)





- MUSCULOSKELETAL/RHEUMATOLOGICAL


Hx Falls: No





- PSYCHIATRIC


Hx Substance Use: No





- ANESTHESIA


Hx Anesthesia: Yes


Hx Anesthesia Reactions: No





Meds


Allergies/Adverse Reactions: 


 Allergies











Allergy/AdvReac Type Severity Reaction Status Date / Time


 


No Known Allergies Allergy   Verified 04/30/18 11:04














- Medications


Medications: 


 Current Medications





Albuterol/Ipratropium (Duoneb 3 Mg/0.5 Mg (3 Ml) Ud)  3 ml INH RQ6 [facility]


   Last Admin: 05/02/18 01:24 Dose:  3 ml


Aspirin (Aspirin Chewable)  81 mg PO DAILY [facility]


   Last Admin: 05/01/18 10:56 Dose:  81 mg


Dabigatran (Pradaxa)  75 mg PO DAILY [facility]


   Last Admin: 05/01/18 11:17 Dose:  75 mg


Diphenhydramine HCl (Benadryl)  25 mg PO HS PRN


   PRN Reason: Insomnia


Famotidine (Pepcid)  20 mg PO DAILY [facility]


   Last Admin: 05/01/18 10:56 Dose:  20 mg


Furosemide (Lasix)  40 mg IVP DAILY [facility]


Isosorbide Mononitrate (Imdur Er)  30 mg PO DAILY [facility]


   Last Admin: 05/01/18 10:57 Dose:  30 mg











Results





- Vital Signs


Recent Vital Signs: 


 Last Vital Signs











Temp  97.6 F   05/01/18 23:25


 


Pulse  71   05/02/18 03:49


 


Resp  20   05/01/18 23:25


 


BP  102/61   05/01/18 23:25


 


Pulse Ox  95   05/01/18 23:25














- Labs


Result Diagrams: 


 04/30/18 12:38





 04/30/18 12:38


Labs: 


 Laboratory Results - last 24 hr











  05/01/18 05/01/18 05/01/18





  16:10 16:13 21:12


 


Puncture Site  Rra  


 


pCO2  38  


 


pO2  87  


 


HCO3  30.2 H  


 


ABG pH  7.51 H  


 


ABG Total CO2  31.5 H  


 


ABG O2 Saturation  99.0 H  


 


ABG Base Excess  6.8 H  


 


ABG Hemoglobin  11.4 L  


 


ABG Carboxyhemoglobin  2.2 H  


 


POC ABG HHb (Measured)  1.0  


 


ABG Methemoglobin  1.7  


 


Chinmay Test  Pos  


 


A-a O2 Difference  15.0  


 


Respiratory Index  0.2  


 


Hgb O2 Saturation  95.1  


 


FiO2  21.0  


 


POC Glucose (mg/dL)   98  118 H

## 2018-05-03 LAB
ALBUMIN SERPL-MCNC: 3.4 G/DL (ref 3.5–5)
ALBUMIN/GLOB SERPL: 0.9 {RATIO} (ref 1–2.1)
ALT SERPL-CCNC: 16 U/L (ref 21–72)
AST SERPL-CCNC: 24 U/L (ref 17–59)
BASOPHILS # BLD AUTO: 0 K/UL (ref 0–0.2)
BASOPHILS NFR BLD: 0.5 % (ref 0–2)
BUN SERPL-MCNC: 24 MG/DL (ref 9–20)
CALCIUM SERPL-MCNC: 9.4 MG/DL (ref 8.6–10.4)
EOSINOPHIL # BLD AUTO: 0.3 K/UL (ref 0–0.7)
EOSINOPHIL NFR BLD: 3.4 % (ref 0–4)
ERYTHROCYTE [DISTWIDTH] IN BLOOD BY AUTOMATED COUNT: 13.3 % (ref 11.5–14.5)
GFR NON-AFRICAN AMERICAN: > 60
HGB BLD-MCNC: 10.8 G/DL (ref 12–18)
LYMPHOCYTES # BLD AUTO: 1.1 K/UL (ref 1–4.3)
LYMPHOCYTES NFR BLD AUTO: 13.7 % (ref 20–40)
MCH RBC QN AUTO: 31.6 PG (ref 27–31)
MCHC RBC AUTO-ENTMCNC: 34.5 G/DL (ref 33–37)
MCV RBC AUTO: 91.6 FL (ref 80–94)
MONOCYTES # BLD: 1.2 K/UL (ref 0–0.8)
MONOCYTES NFR BLD: 14.7 % (ref 0–10)
NEUTROPHILS # BLD: 5.5 K/UL (ref 1.8–7)
NEUTROPHILS NFR BLD AUTO: 67.7 % (ref 50–75)
NRBC BLD AUTO-RTO: 0 % (ref 0–2)
PLATELET # BLD: 282 K/UL (ref 130–400)
PMV BLD AUTO: 9.5 FL (ref 7.2–11.7)
RBC # BLD AUTO: 3.41 MIL/UL (ref 4.4–5.9)
WBC # BLD AUTO: 8.1 K/UL (ref 4.8–10.8)

## 2018-05-03 RX ADMIN — IPRATROPIUM BROMIDE AND ALBUTEROL SULFATE SCH ML: .5; 3 SOLUTION RESPIRATORY (INHALATION) at 01:20

## 2018-05-03 RX ADMIN — IPRATROPIUM BROMIDE AND ALBUTEROL SULFATE SCH ML: .5; 3 SOLUTION RESPIRATORY (INHALATION) at 19:54

## 2018-05-03 RX ADMIN — IPRATROPIUM BROMIDE AND ALBUTEROL SULFATE SCH ML: .5; 3 SOLUTION RESPIRATORY (INHALATION) at 08:30

## 2018-05-03 RX ADMIN — IPRATROPIUM BROMIDE AND ALBUTEROL SULFATE SCH ML: .5; 3 SOLUTION RESPIRATORY (INHALATION) at 13:24

## 2018-05-03 NOTE — CP.PCM.PN
Subjective





- Date & Time of Evaluation


Date of Evaluation: 05/03/18


Time of Evaluation: 18:57





- Subjective


Subjective: 





Patient is having episodes of cough.


Minimal exertional dyspnea noted.


Difficulty in getting up.


Patient is having bilateral leg swelling.


Eating very poorly at this time.


No diarrhea.


No chest pain.











Objective





- Vital Signs/Intake and Output


Vital Signs (last 24 hours): 


 











Temp Pulse Resp BP Pulse Ox


 


 98.6 F   79   20   102/66   94 L


 


 05/03/18 15:12  05/03/18 16:00  05/03/18 15:12  05/03/18 21:08  05/03/18 15:12








Intake and Output: 





On examination:


Chest bilateral wheezing noted minimally


Hartzog


Abdomen soft nontender.


Pedal edema bilaterally noted.





Patient is alert awake and he is sitting up in the chair.


Family at bedside.











- Medications


Medications: 


 Current Medications





Albuterol/Ipratropium (Duoneb 3 Mg/0.5 Mg (3 Ml) Ud)  3 ml INH RQ6 [facility]


   Last Admin: 05/03/18 19:54 Dose:  3 ml


Aspirin (Aspirin Chewable)  81 mg PO DAILY [facility]


   Last Admin: 05/03/18 09:34 Dose:  81 mg


Dabigatran (Pradaxa)  75 mg PO DAILY [facility]


   Last Admin: 05/03/18 11:06 Dose:  75 mg


Diphenhydramine HCl (Benadryl)  25 mg PO HS PRN


   PRN Reason: Insomnia


   Last Admin: 05/03/18 21:08 Dose:  25 mg


Famotidine (Pepcid)  20 mg PO DAILY [facility]


   Last Admin: 05/03/18 09:33 Dose:  20 mg


Furosemide (Lasix)  20 mg IVP Q12 [facility]


   Last Admin: 05/03/18 21:08 Dose:  20 mg


Isosorbide Mononitrate (Imdur Er)  30 mg PO DAILY [facility]


   Last Admin: 05/03/18 09:34 Dose:  30 mg


Losartan Potassium (Cozaar)  25 mg PO DAILY [facility]











- Labs


Labs: 


 





 05/03/18 07:11 





 05/03/18 07:11 





 











PT  17.9 SECONDS (9.7-12.2)  H  04/30/18  12:38    


 


INR  1.5   04/30/18  12:38    


 


APTT  56 SECONDS (21-34)  H  04/30/18  12:38    














Assessment and Plan


(1) Systolic heart failure secondary to hypertension


Assessment & Plan: 


84-year-old male admitted to the hospital with acute exacerbation of systolic 

heart failure.


Patient is also having fluid overload.


Pulmonary edema


Bilateral pedal edema


Slowly improving.


Spoke to the cardiologist.


Patient has advanced to cardiac disease at this time.


Gently we will continue the diuretics at this time.


Patient advanced the age not a candidate for any aggressive treatment at this 

time.


Currently on anticoagulation. We will follow the patient


Status: Acute   





(2) Hypertension


Status: Acute

## 2018-05-03 NOTE — CP.PCM.PN
Subjective





- Date & Time of Evaluation


Date of Evaluation: 05/03/18


Time of Evaluation: 11:10





- Subjective


Subjective: 





Patient seen and evaluated 


States improvement in breathing


Wants to go home





Objective





- Vital Signs/Intake and Output


Vital Signs (last 24 hours): 


 











Temp Pulse Resp BP Pulse Ox


 


 98.6 F   79   20   102/66   94 L


 


 05/03/18 15:12  05/03/18 16:00  05/03/18 15:12  05/03/18 21:08  05/03/18 15:12











- Medications


Medications: 


 Current Medications





Albuterol/Ipratropium (Duoneb 3 Mg/0.5 Mg (3 Ml) Ud)  3 ml INH RQ6 [facility]


   Last Admin: 05/03/18 19:54 Dose:  3 ml


Aspirin (Aspirin Chewable)  81 mg PO DAILY [facility]


   Last Admin: 05/03/18 09:34 Dose:  81 mg


Dabigatran (Pradaxa)  75 mg PO DAILY [facility]


   Last Admin: 05/03/18 11:06 Dose:  75 mg


Diphenhydramine HCl (Benadryl)  25 mg PO HS PRN


   PRN Reason: Insomnia


   Last Admin: 05/03/18 21:08 Dose:  25 mg


Famotidine (Pepcid)  20 mg PO DAILY [facility]


   Last Admin: 05/03/18 09:33 Dose:  20 mg


Furosemide (Lasix)  20 mg IVP Q12 [facility]


   Last Admin: 05/03/18 21:08 Dose:  20 mg


Isosorbide Mononitrate (Imdur Er)  30 mg PO DAILY [facility]


   Last Admin: 05/03/18 09:34 Dose:  30 mg


Losartan Potassium (Cozaar)  25 mg PO DAILY [facility]











- Labs


Labs: 


 





 05/03/18 07:11 





 05/03/18 07:11 





 











PT  17.9 SECONDS (9.7-12.2)  H  04/30/18  12:38    


 


INR  1.5   04/30/18  12:38    


 


APTT  56 SECONDS (21-34)  H  04/30/18  12:38

## 2018-05-04 RX ADMIN — IPRATROPIUM BROMIDE AND ALBUTEROL SULFATE SCH ML: .5; 3 SOLUTION RESPIRATORY (INHALATION) at 07:38

## 2018-05-04 RX ADMIN — IPRATROPIUM BROMIDE AND ALBUTEROL SULFATE SCH ML: .5; 3 SOLUTION RESPIRATORY (INHALATION) at 01:53

## 2018-05-04 RX ADMIN — IPRATROPIUM BROMIDE AND ALBUTEROL SULFATE SCH ML: .5; 3 SOLUTION RESPIRATORY (INHALATION) at 13:09

## 2018-05-04 RX ADMIN — IPRATROPIUM BROMIDE AND ALBUTEROL SULFATE SCH ML: .5; 3 SOLUTION RESPIRATORY (INHALATION) at 19:21

## 2018-05-04 NOTE — CP.PCM.PN
<Shakeel Griffiths - Last Filed: 05/04/18 13:32>





Subjective





- Date & Time of Evaluation


Date of Evaluation: 05/04/18


Time of Evaluation: 09:17





- Subjective


Subjective: 





PGY-2 note for Dr. Vazquez's cardiology service:





Pt seen and examined at bedside. Nursing reports no acute events overnight. Pt 

found resting comfortably seated at bedside. He is able to talk in full 

sentences but admits working with PT is difficult as he quickly becomes SOB.





Objective





- Vital Signs/Intake and Output


Vital Signs (last 24 hours): 


 











Temp Pulse Resp BP Pulse Ox


 


 98 F   90   20   105/73   97 


 


 05/04/18 07:00  05/04/18 07:45  05/04/18 07:00  05/04/18 07:00  05/04/18 07:00








Intake and Output: 


 











 05/04/18 05/04/18





 06:59 18:59


 


Intake Total 320 


 


Output Total 450 


 


Balance -130 














- Medications


Medications: 


 Current Medications





Albuterol/Ipratropium (Duoneb 3 Mg/0.5 Mg (3 Ml) Ud)  3 ml INH RQ6 [facility]


   Last Admin: 05/04/18 07:38 Dose:  3 ml


Aspirin (Aspirin Chewable)  81 mg PO DAILY [facility]


   Last Admin: 05/03/18 09:34 Dose:  81 mg


Dabigatran (Pradaxa)  75 mg PO DAILY [facility]


   Last Admin: 05/03/18 11:06 Dose:  75 mg


Diphenhydramine HCl (Benadryl)  25 mg PO HS PRN


   PRN Reason: Insomnia


   Last Admin: 05/03/18 21:08 Dose:  25 mg


Famotidine (Pepcid)  20 mg PO DAILY [facility]


   Last Admin: 05/03/18 09:33 Dose:  20 mg


Furosemide (Lasix)  20 mg IVP Q12 [facility]


   Last Admin: 05/03/18 21:08 Dose:  20 mg


Isosorbide Mononitrate (Imdur Er)  30 mg PO DAILY [facility]


   Last Admin: 05/03/18 09:34 Dose:  30 mg


Losartan Potassium (Cozaar)  25 mg PO DAILY [facility]











- Labs


Labs: 


 





 05/03/18 07:11 





 05/03/18 07:11 





 











PT  17.9 SECONDS (9.7-12.2)  H  04/30/18  12:38    


 


INR  1.5   04/30/18  12:38    


 


APTT  56 SECONDS (21-34)  H  04/30/18  12:38    














- Constitutional


Appears: Non-toxic, No Acute Distress, Chronically Ill





- Head Exam


Head Exam: ATRAUMATIC, NORMOCEPHALIC





- Eye Exam


Eye Exam: EOMI.  absent: Scleral icterus


Pupil Exam: PERRL





- ENT Exam


ENT Exam: Mucous Membranes Moist





- Respiratory Exam


Respiratory Exam: NORMAL BREATHING PATTERN.  absent: Accessory Muscle Use, 

Rhonchi





- Cardiovascular Exam


Cardiovascular Exam: REGULAR RHYTHM, +S1, +S2





- GI/Abdominal Exam


GI & Abdominal Exam: Soft, Normal Bowel Sounds.  absent: Tenderness





- Extremities Exam


Extremities Exam: Normal Inspection





- Back Exam


Back Exam: absent: CVA tenderness (L), CVA tenderness (R)





- Neurological Exam


Neurological Exam: Alert, Awake, Oriented x3





- Psychiatric Exam


Psychiatric exam: Normal Affect





- Skin


Skin Exam: Normal Color, Warm





Assessment and Plan





- Assessment and Plan (Free Text)


Plan: 





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


Trop negative x 3


ECHO (4/30/18): EF 28%, LV severely dilated, mild LVH, global hypokinesis, 

Grade III restrictive diastolic dysfunction, flattened septum with elevated 

atrial pressure, moderate to severe dilatation of all cardiac chambers. 

Moderate to severe tricuspid regurgitation. No LV thrombus.


EKG (5/1/18): HR 70, Ventricular paced rhythm, with prolonged QTc.





Lasix 20mg IV Q12H


Pradaxa 75mg PO Daily





Dilated cardiomyopathy


ECHO (4/30/18): EF 28%, Moderate to severe dilatation of all cardiac chambers. 

No LV thrombus.


ASA 81mg PO Daily


Pradaxa 75mg PO Daily





Severe pulm HTN


ECHO (4/30/18): EF 28%, LV severely dilated, mild LVH, global hypokinesis, 

Grade III restrictive diastolic dysfunction, flattened septum with elevated 

atrial pressure, moderate to severe dilatation of all cardiac chambers. 

Moderate to severe tricuspid regurgitation. No LV thrombus.





HTN


well-controlled


Lasix 20mg IV Q12H


Imdur 30mg PO Daily


Losartan 25mg PO Daily





Hx of Pacemaker placement





Shakeel Griffiths PGY2


D/w Dr. Vazquez





<Familia Vazquez - Last Filed: 05/05/18 06:51>





Objective





- Vital Signs/Intake and Output


Vital Signs (last 24 hours): 


 











Temp Pulse Resp BP Pulse Ox


 


 98.0 F   87   20   105/69   97 


 


 05/05/18 04:00  05/05/18 04:00  05/05/18 04:00  05/05/18 04:00  05/05/18 04:00








Intake and Output: 


 











 05/04/18 05/05/18





 18:59 06:59


 


Intake Total  450


 


Output Total  900


 


Balance  -450














- Medications


Medications: 


 Current Medications





Albuterol/Ipratropium (Duoneb 3 Mg/0.5 Mg (3 Ml) Ud)  3 ml INH RQ6 [facility]


   Last Admin: 05/05/18 01:35 Dose:  3 ml


Aspirin (Aspirin Chewable)  81 mg PO DAILY [facility]


   Last Admin: 05/04/18 09:36 Dose:  81 mg


Dabigatran (Pradaxa)  75 mg PO DAILY [facility]


   Last Admin: 05/04/18 12:47 Dose:  75 mg


Diphenhydramine HCl (Benadryl)  25 mg PO HS PRN


   PRN Reason: Insomnia


   Last Admin: 05/03/18 21:08 Dose:  25 mg


Famotidine (Pepcid)  20 mg PO DAILY [facility]


   Last Admin: 05/04/18 09:36 Dose:  20 mg


Furosemide (Lasix)  20 mg IVP Q12 [facility]


   Last Admin: 05/04/18 09:37 Dose:  20 mg


Isosorbide Mononitrate (Imdur Er)  30 mg PO DAILY [facility]


   Last Admin: 05/04/18 09:36 Dose:  30 mg


Losartan Potassium (Cozaar)  25 mg PO DAILY [facility]


   Last Admin: 05/04/18 09:36 Dose:  25 mg











- Labs


Labs: 


 





 05/03/18 07:11 





 05/03/18 07:11 





 











PT  17.9 SECONDS (9.7-12.2)  H  04/30/18  12:38    


 


INR  1.5   04/30/18  12:38    


 


APTT  56 SECONDS (21-34)  H  04/30/18  12:38    














Assessment and Plan





- Assessment and Plan (Free Text)


Plan: 





Patient seen and evaluated personally by me


Plan of care d/w the medical resident and as documented

## 2018-05-05 RX ADMIN — IPRATROPIUM BROMIDE AND ALBUTEROL SULFATE SCH ML: .5; 3 SOLUTION RESPIRATORY (INHALATION) at 19:50

## 2018-05-05 RX ADMIN — IPRATROPIUM BROMIDE AND ALBUTEROL SULFATE SCH ML: .5; 3 SOLUTION RESPIRATORY (INHALATION) at 13:45

## 2018-05-05 RX ADMIN — IPRATROPIUM BROMIDE AND ALBUTEROL SULFATE SCH ML: .5; 3 SOLUTION RESPIRATORY (INHALATION) at 07:30

## 2018-05-05 RX ADMIN — IPRATROPIUM BROMIDE AND ALBUTEROL SULFATE SCH ML: .5; 3 SOLUTION RESPIRATORY (INHALATION) at 01:35

## 2018-05-05 NOTE — CP.PCM.PN
Subjective





- Date & Time of Evaluation


Date of Evaluation: 05/04/18


Time of Evaluation: 19:00





- Subjective


Subjective: 





Patient is currently complaining of some pain in the lower abdominal area, 

denies any chest pain.


Shortness of breath on and off, cough noted.


Patient is very appreciative, he is getting better.


He denies any chest pain


No nausea vomiting, eating slightly better today.


Leg swelling still noted





Objective





- Vital Signs/Intake and Output


Vital Signs (last 24 hours): 


 











Temp Pulse Resp BP Pulse Ox


 


 97.5 F L  80   20   100/61   97 


 


 05/05/18 15:28  05/05/18 15:28  05/05/18 15:28  05/05/18 15:28  05/05/18 15:28








Intake and Output: 


 











 05/05/18 05/05/18





 06:59 18:59


 


Intake Total 450 400


 


Output Total 900 


 


Balance -450 400








Chest good air entry


Regular heart sound


Abdomen nontender


Extremities edema bilaterally CNS alert awake oriented 0. Neurological deficit





- Medications


Medications: 


 Current Medications





Albuterol/Ipratropium (Duoneb 3 Mg/0.5 Mg (3 Ml) Ud)  3 ml INH RQ6 [facility]


   Last Admin: 05/05/18 13:45 Dose:  3 ml


Aspirin (Aspirin Chewable)  81 mg PO DAILY [facility]


   Last Admin: 05/05/18 10:12 Dose:  81 mg


Dabigatran (Pradaxa)  75 mg PO DAILY [facility]


   Last Admin: 05/05/18 10:12 Dose:  75 mg


Diphenhydramine HCl (Benadryl)  25 mg PO HS PRN


   PRN Reason: Insomnia


   Last Admin: 05/03/18 21:08 Dose:  25 mg


Famotidine (Pepcid)  20 mg PO DAILY [facility]


   Last Admin: 05/05/18 10:12 Dose:  20 mg


Furosemide (Lasix)  20 mg IVP Q12 [facility]


   Last Admin: 05/05/18 10:11 Dose:  20 mg


Isosorbide Mononitrate (Imdur Er)  30 mg PO DAILY [facility]


   Last Admin: 05/05/18 10:12 Dose:  30 mg


Losartan Potassium (Cozaar)  25 mg PO DAILY [facility]


   Last Admin: 05/05/18 10:12 Dose:  25 mg











- Labs


Labs: 


 





 05/03/18 07:11 





 05/03/18 07:11 





 











PT  17.9 SECONDS (9.7-12.2)  H  04/30/18  12:38    


 


INR  1.5   04/30/18  12:38    


 


APTT  56 SECONDS (21-34)  H  04/30/18  12:38    














Assessment and Plan


(1) Systolic heart failure secondary to hypertension


Assessment & Plan: 


84-year-old male admitted to the hospital with acute exacerbation of systolic 

heart failure.


Patient is also having fluid overload.


Pulmonary edema


Bilateral pedal edema


Slowly improving.


Spoke to the cardiologist.


Patient has advanced to cardiac disease at this time.


Gently we will continue the diuretics at this time.


Patient advanced the age not a candidate for any aggressive treatment at this 

time.


Currently on anticoagulation. We will follow the patient


Status: Acute   





(2) Hypertension


Status: Acute

## 2018-05-05 NOTE — CP.PCM.PN
Subjective





- Date & Time of Evaluation


Date of Evaluation: 05/05/18


Time of Evaluation: 19:01





- Subjective


Subjective: 





Patient is now having no pain


But he is having some difficulty in breathing while he is standing up and 

walking.


Difficulty in getting up and walk.








Objective





- Vital Signs/Intake and Output


Vital Signs (last 24 hours): 


 











Temp Pulse Resp BP Pulse Ox


 


 97.5 F L  80   20   100/61   97 


 


 05/05/18 15:28  05/05/18 15:28  05/05/18 15:28  05/05/18 15:28  05/05/18 15:28








Intake and Output: 


 











 05/05/18 05/05/18





 06:59 18:59


 


Intake Total 450 400


 


Output Total 900 


 


Balance -450 400








Chest good air entry bilaterally irregular heart sound


Nontender abdomen


Edema bilaterally noted, some improvement





- Medications


Medications: 


 Current Medications





Albuterol/Ipratropium (Duoneb 3 Mg/0.5 Mg (3 Ml) Ud)  3 ml [facility]


   Last Admin: 05/05/18 13:45 Dose:  3 ml


Aspirin (Aspirin Chewable)  81 mg PO DAILY [facility]


   Last Admin: 05/05/18 10:12 Dose:  81 mg


Dabigatran (Pradaxa)  75 mg PO DAILY [facility]


   Last Admin: 05/05/18 10:12 Dose:  75 mg


Diphenhydramine HCl (Benadryl)  25 mg PO HS PRN


   PRN Reason: Insomnia


   Last Admin: 05/03/18 21:08 Dose:  25 mg


Famotidine (Pepcid)  20 mg PO DAILY [facility]


   Last Admin: 05/05/18 10:12 Dose:  20 mg


Furosemide (Lasix)  20 mg IVP Q12 [facility]


   Last Admin: 05/05/18 10:11 Dose:  20 mg


Isosorbide Mononitrate (Imdur Er)  30 mg PO DAILY [facility]


   Last Admin: 05/05/18 10:12 Dose:  30 mg


Losartan Potassium (Cozaar)  25 mg PO DAILY [facility]


   Last Admin: 05/05/18 10:12 Dose:  25 mg











- Labs


Labs: 


 





 05/03/18 07:11 





 05/03/18 07:11 





 











PT  17.9 SECONDS (9.7-12.2)  H  04/30/18  12:38    


 


INR  1.5   04/30/18  12:38    


 


APTT  56 SECONDS (21-34)  H  04/30/18  12:38    














Assessment and Plan


(1) Systolic heart failure secondary to hypertension


Assessment & Plan: 


Patient is having acute exacerbation of systolic heart failure.


Cardiomyopathy secondary to hypertension and possible CAD


Patient is currently on anticoagulation.


Patient was seen by physical therapist, patient is having significant 

deoxygenation while walking.


Patient will benefit with the home oxygen.


Will continue the current treatment.





Status: Acute   





(2) Hypertension


Status: Acute

## 2018-05-06 VITALS — RESPIRATION RATE: 20 BRPM

## 2018-05-06 RX ADMIN — IPRATROPIUM BROMIDE AND ALBUTEROL SULFATE SCH ML: .5; 3 SOLUTION RESPIRATORY (INHALATION) at 07:54

## 2018-05-06 RX ADMIN — IPRATROPIUM BROMIDE AND ALBUTEROL SULFATE SCH ML: .5; 3 SOLUTION RESPIRATORY (INHALATION) at 01:09

## 2018-05-06 RX ADMIN — IPRATROPIUM BROMIDE AND ALBUTEROL SULFATE SCH ML: .5; 3 SOLUTION RESPIRATORY (INHALATION) at 19:43

## 2018-05-06 RX ADMIN — IPRATROPIUM BROMIDE AND ALBUTEROL SULFATE SCH ML: .5; 3 SOLUTION RESPIRATORY (INHALATION) at 13:36

## 2018-05-06 NOTE — CP.PCM.PN
Subjective





- Date & Time of Evaluation


Date of Evaluation: 05/05/18


Time of Evaluation: 12:05





- Subjective


Subjective: 





Pt seen and examined at bedside. Nursing reports no acute events overnight





Physical Examination





- Constitutional


Appears: Non-toxic, No Acute Distress, Chronically Ill





- Head Exam


Head Exam: ATRAUMATIC, NORMOCEPHALIC





- Eye Exam


Eye Exam: EOMI.  absent: Scleral icterus


Pupil Exam: PERRL





- ENT Exam


ENT Exam: Mucous Membranes Moist





- Respiratory Exam


Respiratory Exam: NORMAL BREATHING PATTERN.  absent: Accessory Muscle Use, 

Rhonchi





- Cardiovascular Exam


Cardiovascular Exam: REGULAR RHYTHM, +S1, +S2





- GI/Abdominal Exam


GI & Abdominal Exam: Soft, Normal Bowel Sounds.  absent: Tenderness





- Extremities Exam


Extremities Exam: Normal Inspection





- Back Exam


Back Exam: absent: CVA tenderness (L), CVA tenderness (R)





- Neurological Exam


Neurological Exam: Alert, Awake, Oriented x3





- Psychiatric Exam


Psychiatric exam: Normal Affect





- Skin


Skin Exam: Normal Color, Warm





Objective





- Vital Signs/Intake and Output


Vital Signs (last 24 hours): 


 











Temp Pulse Resp BP Pulse Ox


 


 98.2 F   81   20   94/57 L  97 


 


 05/05/18 21:40  05/05/18 21:40  05/05/18 21:40  05/05/18 21:41  05/05/18 21:40








Intake and Output: 


 











 05/05/18 05/06/18





 18:59 06:59


 


Intake Total 400 200


 


Output Total  350


 


Balance 400 -150














- Medications


Medications: 


 Current Medications





Albuterol/Ipratropium (Duoneb 3 Mg/0.5 Mg (3 Ml) Ud)  3 ml INH RQ6 [facility]


   Last Admin: 05/05/18 19:50 Dose:  3 ml


Aspirin (Aspirin Chewable)  81 mg PO DAILY [facility]


   Last Admin: 05/05/18 10:12 Dose:  81 mg


Dabigatran (Pradaxa)  75 mg PO DAILY [facility]


   Last Admin: 05/05/18 10:12 Dose:  75 mg


Diphenhydramine HCl (Benadryl)  25 mg PO HS PRN


   PRN Reason: Insomnia


   Last Admin: 05/03/18 21:08 Dose:  25 mg


Famotidine (Pepcid)  20 mg PO DAILY [facility]


   Last Admin: 05/05/18 10:12 Dose:  20 mg


Furosemide (Lasix)  20 mg IVP Q12 [facility]


   Last Admin: 05/05/18 21:41 Dose:  Not Given


Isosorbide Mononitrate (Imdur Er)  30 mg PO DAILY [facility]


   Last Admin: 05/05/18 10:12 Dose:  30 mg


Losartan Potassium (Cozaar)  25 mg PO DAILY [facility]


   Last Admin: 05/05/18 10:12 Dose:  25 mg











- Labs


Labs: 


 





 05/03/18 07:11 





 05/03/18 07:11 





 











PT  17.9 SECONDS (9.7-12.2)  H  04/30/18  12:38    


 


INR  1.5   04/30/18  12:38    


 


APTT  56 SECONDS (21-34)  H  04/30/18  12:38    














Assessment and Plan





- Assessment and Plan (Free Text)


Assessment: 





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


Trop negative x 3


ECHO (4/30/18): EF 28%, LV severely dilated, mild LVH, global hypokinesis, 

Grade III restrictive diastolic dysfunction, flattened septum with elevated 

atrial pressure, moderate to severe dilatation of all cardiac chambers. 

Moderate to severe tricuspid regurgitation. No LV thrombus.


EKG (5/1/18): HR 70, Ventricular paced rhythm, with prolonged QTc.





Lasix 20mg IV Q12H


Pradaxa 75mg PO Daily





Dilated cardiomyopathy


ECHO (4/30/18): EF 28%, Moderate to severe dilatation of all cardiac chambers. 

No LV thrombus.


ASA 81mg PO Daily


Pradaxa 75mg PO Daily





Severe pulm HTN


ECHO (4/30/18): EF 28%, LV severely dilated, mild LVH, global hypokinesis, 

Grade III restrictive diastolic dysfunction, flattened septum with elevated 

atrial pressure, moderate to severe dilatation of all cardiac chambers. 

Moderate to severe tricuspid regurgitation. No LV thrombus.





HTN


well-controlled


Lasix 20mg IV Q12H


Imdur 30mg PO Daily


Losartan 25mg PO Daily





Hx of AICD placement

## 2018-05-06 NOTE — CP.PCM.PN
Subjective





- Date & Time of Evaluation


Date of Evaluation: 05/06/18


Time of Evaluation: 16:05





- Subjective


Subjective: 





Pt seen and examined at bedside.


Denies chest pain and dyspnea





Awaiting Rahab placement





Physical Examination





- Constitutional


Appears: Non-toxic, No Acute Distress, Chronically Ill





- Head Exam


Head Exam: ATRAUMATIC, NORMOCEPHALIC





- Eye Exam


Eye Exam: EOMI.  absent: Scleral icterus


Pupil Exam: PERRL





- ENT Exam


ENT Exam: Mucous Membranes Moist





- Respiratory Exam


Respiratory Exam: NORMAL BREATHING PATTERN.  absent: Accessory Muscle Use, 

Rhonchi





- Cardiovascular Exam


Cardiovascular Exam: REGULAR RHYTHM, +S1, +S2





- GI/Abdominal Exam


GI & Abdominal Exam: Soft, Normal Bowel Sounds.  absent: Tenderness





- Extremities Exam


Extremities Exam: Normal Inspection





- Back Exam


Back Exam: absent: CVA tenderness (L), CVA tenderness (R)





- Neurological Exam


Neurological Exam: Alert, Awake, Oriented x3





- Psychiatric Exam


Psychiatric exam: Normal Affect





- Skin


Skin Exam: Normal Color, Warm





Objective





- Vital Signs/Intake and Output


Vital Signs (last 24 hours): 


 











Temp Pulse Resp BP Pulse Ox


 


 97.5 F L  85   18   90/52 L  95 


 


 05/06/18 15:00  05/06/18 15:00  05/06/18 15:00  05/06/18 15:00  05/06/18 15:00








Intake and Output: 


 











 05/06/18 05/07/18





 18:59 06:59


 


Intake Total 400 


 


Balance 400 














- Medications


Medications: 


 Current Medications





Albuterol/Ipratropium (Duoneb 3 Mg/0.5 Mg (3 Ml) Ud)  3 ml INH RQ6 [facility]


   Last Admin: 05/06/18 19:43 Dose:  3 ml


Aspirin (Aspirin Chewable)  81 mg PO DAILY [facility]


   Last Admin: 05/06/18 10:32 Dose:  81 mg


Dabigatran (Pradaxa)  75 mg PO DAILY [facility]


   Last Admin: 05/06/18 10:32 Dose:  75 mg


Diphenhydramine HCl (Benadryl)  25 mg PO HS PRN


   PRN Reason: Insomnia


   Last Admin: 05/03/18 21:08 Dose:  25 mg


Famotidine (Pepcid)  20 mg PO DAILY [facility]


   Last Admin: 05/06/18 10:32 Dose:  20 mg


Furosemide (Lasix)  20 mg IVP Q12 [facility]


   Last Admin: 05/06/18 10:32 Dose:  20 mg


Isosorbide Mononitrate (Imdur Er)  30 mg PO DAILY [facility]


   Last Admin: 05/06/18 10:32 Dose:  30 mg


Losartan Potassium (Cozaar)  25 mg PO DAILY [facility]


   Last Admin: 05/06/18 10:32 Dose:  25 mg











- Labs


Labs: 


 





 05/03/18 07:11 





 05/03/18 07:11 





 











PT  17.9 SECONDS (9.7-12.2)  H  04/30/18  12:38    


 


INR  1.5   04/30/18  12:38    


 


APTT  56 SECONDS (21-34)  H  04/30/18  12:38    














Assessment and Plan





- Assessment and Plan (Free Text)


Assessment: 





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


Trop negative x 3


ECHO (4/30/18): EF 28%, LV severely dilated, mild LVH, global hypokinesis, 

Grade III restrictive diastolic dysfunction, flattened septum with elevated 

atrial pressure, moderate to severe dilatation of all cardiac chambers. 

Moderate to severe tricuspid regurgitation. No LV thrombus.


EKG (5/1/18): HR 70, Ventricular paced rhythm, with prolonged QTc.





Lasix 20mg IV Q12H


Pradaxa 75mg PO Daily





Dilated cardiomyopathy


ECHO (4/30/18): EF 28%, Moderate to severe dilatation of all cardiac chambers. 

No LV thrombus.


ASA 81mg PO Daily


Pradaxa 75mg PO Daily





Severe pulm HTN


ECHO (4/30/18): EF 28%, LV severely dilated, mild LVH, global hypokinesis, 

Grade III restrictive diastolic dysfunction, flattened septum with elevated 

atrial pressure, moderate to severe dilatation of all cardiac chambers. 

Moderate to severe tricuspid regurgitation. No LV thrombus.





HTN


well-controlled


Lasix 20mg IV Q12H


Imdur 30mg PO Daily


Losartan 25mg PO Daily





Hx of AICD placement

## 2018-05-07 VITALS
OXYGEN SATURATION: 99 % | TEMPERATURE: 98 F | DIASTOLIC BLOOD PRESSURE: 65 MMHG | SYSTOLIC BLOOD PRESSURE: 113 MMHG | HEART RATE: 93 BPM

## 2018-05-07 RX ADMIN — IPRATROPIUM BROMIDE AND ALBUTEROL SULFATE SCH: .5; 3 SOLUTION RESPIRATORY (INHALATION) at 06:42

## 2018-05-07 RX ADMIN — IPRATROPIUM BROMIDE AND ALBUTEROL SULFATE SCH ML: .5; 3 SOLUTION RESPIRATORY (INHALATION) at 07:39

## 2018-05-07 RX ADMIN — IPRATROPIUM BROMIDE AND ALBUTEROL SULFATE SCH ML: .5; 3 SOLUTION RESPIRATORY (INHALATION) at 13:08

## 2018-05-07 NOTE — CP.PCM.DIS
Provider





- Provider


Date of Admission: 


04/30/18 13:09





Attending physician: 


Ryann Escoto MD





Time Spent in preparation of Discharge (in minutes): 45





Diagnosis





- Discharge Diagnosis


(1) Systolic heart failure secondary to hypertension


Status: Acute   





(2) Hypertension


Status: Acute   





(3) Systolic and diastolic CHF w/reduced LV function, NYHA class 4


Status: Acute   





(4) Pulmonary hypertension


Status: Acute   





(5) CAD (coronary atherosclerotic disease)


Status: Acute   





(6) Pedal edema


Status: Acute   





(7) Hypertensive heart disease with heart failure


Status: Acute   





Hospital Course





- Lab Results


Lab Results: 


 Most Recent Lab Values











WBC  8.1 K/uL (4.8-10.8)   05/03/18  07:11    


 


RBC  3.41 Mil/uL (4.40-5.90)  L  05/03/18  07:11    


 


Hgb  10.8 g/dL (12.0-18.0)  L  05/03/18  07:11    


 


Hct  31.2 % (35.0-51.0)  L  05/03/18  07:11    


 


MCV  91.6 fL (80.0-94.0)   05/03/18  07:11    


 


MCH  31.6 pg (27.0-31.0)  H  05/03/18  07:11    


 


MCHC  34.5 g/dL (33.0-37.0)   05/03/18  07:11    


 


RDW  13.3 % (11.5-14.5)   05/03/18  07:11    


 


Plt Count  282 K/uL (130-400)   05/03/18  07:11    


 


MPV  9.5 fL (7.2-11.7)   05/03/18  07:11    


 


Neut % (Auto)  67.7 % (50.0-75.0)   05/03/18  07:11    


 


Lymph % (Auto)  13.7 % (20.0-40.0)  L  05/03/18  07:11    


 


Mono % (Auto)  14.7 % (0.0-10.0)  H  05/03/18  07:11    


 


Eos % (Auto)  3.4 % (0.0-4.0)   05/03/18  07:11    


 


Baso % (Auto)  0.5 % (0.0-2.0)   05/03/18  07:11    


 


Neut # (Auto)  5.5 K/uL (1.8-7.0)   05/03/18  07:11    


 


Lymph # (Auto)  1.1 K/uL (1.0-4.3)   05/03/18  07:11    


 


Mono # (Auto)  1.2 K/uL (0.0-0.8)  H  05/03/18  07:11    


 


Eos # (Auto)  0.3 K/uL (0.0-0.7)   05/03/18  07:11    


 


Baso # (Auto)  0.0 K/uL (0.0-0.2)   05/03/18  07:11    


 


PT  17.9 SECONDS (9.7-12.2)  H  04/30/18  12:38    


 


INR  1.5   04/30/18  12:38    


 


APTT  56 SECONDS (21-34)  H  04/30/18  12:38    


 


Puncture Site  Rra   05/01/18  16:10    


 


pCO2  38 mm/Hg (35-45)   05/01/18  16:10    


 


pO2  87 mm/Hg ()   05/01/18  16:10    


 


HCO3  30.2 mmol/L (21-28)  H  05/01/18  16:10    


 


ABG pH  7.51  (7.35-7.45)  H  05/01/18  16:10    


 


ABG Total CO2  31.5 mmol/L (22-28)  H  05/01/18  16:10    


 


ABG O2 Saturation  99.0 % (95-98)  H  05/01/18  16:10    


 


ABG Base Excess  6.8 mmol/L (-2.0-3.0)  H  05/01/18  16:10    


 


ABG Hemoglobin  11.4 g/dL (11.7-17.4)  L  05/01/18  16:10    


 


ABG Carboxyhemoglobin  2.2 % (0.5-1.5)  H  05/01/18  16:10    


 


POC ABG HHb (Measured)  1.0 % (0.0-5.0)   05/01/18  16:10    


 


ABG Methemoglobin  1.7 % (0.0-3.0)   05/01/18  16:10    


 


Chinmay Test  Pos   05/01/18  16:10    


 


A-a O2 Difference  15.0 mm/Hg  05/01/18  16:10    


 


Respiratory Index  0.2   05/01/18  16:10    


 


Hgb O2 Saturation  95.1 % (95.0-98.0)   05/01/18  16:10    


 


FiO2  21.0 %  05/01/18  16:10    


 


Sodium  141 mmol/L (132-148)   05/03/18  07:11    


 


Potassium  3.9 mmol/L (3.6-5.2)   05/03/18  07:11    


 


Chloride  101 mmol/L ()   05/03/18  07:11    


 


Carbon Dioxide  30 mmol/L (22-30)   05/03/18  07:11    


 


Anion Gap  13  (10-20)   05/03/18  07:11    


 


BUN  24 mg/dL (9-20)  H  05/03/18  07:11    


 


Creatinine  1.0 mg/dL (0.8-1.5)   05/03/18  07:11    


 


Est GFR ( Amer)  > 60   05/03/18  07:11    


 


Est GFR (Non-Af Amer)  > 60   05/03/18  07:11    


 


POC Glucose (mg/dL)  127 mg/dL ()  H  05/04/18  11:03    


 


Random Glucose  109 mg/dL ()   05/03/18  07:11    


 


Calcium  9.4 mg/dl (8.6-10.4)   05/03/18  07:11    


 


Phosphorus  2.9 mg/dL (2.5-4.5)   05/02/18  07:03    


 


Magnesium  2.0 mg/dL (1.6-2.3)   05/02/18  07:03    


 


Total Bilirubin  1.0 mg/dL (0.2-1.3)   05/03/18  07:11    


 


AST  24 U/L (17-59)   05/03/18  07:11    


 


ALT  16 U/L (21-72)  L D 05/03/18  07:11    


 


Alkaline Phosphatase  78 U/L ()   05/03/18  07:11    


 


Total Creatine Kinase  45 U/L ()  L  05/01/18  05:15    


 


CK-MB (Mass)  0.65 ng/mL (0.0-3.38)   05/01/18  05:15    


 


Troponin I  0.0310 ng/mL (0.00-0.120)   05/01/18  05:15    


 


NT-Pro-B Natriuret Pep  36298 pg/mL (0-900)  H  04/30/18  12:38    


 


Total Protein  7.1 g/dL (6.3-8.3)   05/03/18  07:11    


 


Albumin  3.4 g/dL (3.5-5.0)  L  05/03/18  07:11    


 


Globulin  3.7 gm/dL (2.2-3.9)   05/03/18  07:11    


 


Albumin/Globulin Ratio  0.9  (1.0-2.1)  L  05/03/18  07:11    














- Hospital Course


Hospital Course: 











Chief complaint:


Shortness of breath.





History present illness:


84-year-old male with history of hypertension, pacemaker placement came to the 

emergency room after was sent by pulmonologist.


Patient was having increasing gradually shortness of breath, also gradual leg 

swelling.


2 weeks his symptoms got worse.


He started having increasing leg swelling, increasing SOB.


He denies any chest pain.


He was not able to lie flat.


His urinary frequency also decreasing.


He cannot able to complete a sentence.


He was having increasing weakness and tiredness and easy fatigability.








PMD: 





Patient was hospitalized with the diagnosis of bilateral pedal edema, possible 

congestive heart failure.


Patient was placed on oxygen.


Diuretics started.


Patient was seen by cardiologist.


Echocardiogram was done.











Echo:





The patient echocardiogram done on 05/01/2018


Moderate to severe dilatation of all cardiac chambers.


Severe systolic and diastolic left ventricle dysfunction


Right ventricular systolic function moderately reduced to


Possible right ventricular pressure overload


Mild aortic regurgitation


Moderate mitral regurgitation


Moderate to severe tricuspid regurgitation


Right ventricle systolic pressure greater than 60


Possible severe pulmonary hypertension


Severe global hypokinesis


Ejection fraction is 25%





Patient can medical condition improved with the diuretics.


His leg swelling is improving.


Patient definitely will need home oxygen.


Upon walking patient has a desaturation to 86% on room air.


Patient also has a severe pulmonary hypertension.








Patient currently stable, sitting up comfortably.


Discussed with the patient.


He is agreeing to go to subacute rehabilitation.


Patient will be going to the rehabilitation.


He will continue the oxygen.


Diuretics, and coagulation.


Patient will follow up with cardiologist and and also PMDD.


He will be getting the home oxygen from the rehabilitation.


He'll follow up with the his cardiologist.


Information was given to the patient and the patient's sister





Discharge Exam





- Head Exam


Head Exam: ATRAUMATIC, NORMOCEPHALIC





Discharge Plan





- Follow Up Plan


Condition: FAIR


Disposition: HOME/ ROUTINE


Instructions:  Heart Healthy Diet, DASH Diet, Heart Failure, Adult (DC), Low 

Salt Diet, Hypertension (DC)


Additional Instructions: 


-PER DR. ESCOTO, PLEASE PLACE UNDER THE SERVICE OF DR. LEONARD---CALL DR. LEONARD UPON ARRIVAL TO FACILITY.


-CONTINUE MEDICATIONS PER THE MED REC FORM---CHANGES CAN BE MADE BY DR. LEONARD.


-MR. COLLINS TO FOLLOW UP WITH DR. ESCOTO IN THE OFFICE AFTER DISCHARGE FROM 

REHAB.





Referrals: 


Ryann Escoto MD [Staff Provider] -

## 2018-05-07 NOTE — CP.PCM.PN
Subjective





- Date & Time of Evaluation


Date of Evaluation: 05/06/18


Time of Evaluation: 19:03





- Subjective


Subjective: 





Patient last night had an episode of hypotension.


Unable to give the diuretics and antihypertensives.


He was also having some difficulty in sleeping, but he does not have any chest 

pain.


Today he is feeling slightly better.


No chest pain 





Objective





- Vital Signs/Intake and Output


Vital Signs (last 24 hours): 


 











Temp Pulse Resp BP Pulse Ox


 


 98.0 F   93 H  20   113/65   99 


 


 05/07/18 16:00  05/07/18 16:00  05/07/18 16:00  05/07/18 16:00  05/07/18 16:00








Intake and Output: 


 











 05/07/18 05/07/18





 06:59 18:59


 


Intake Total 440 400


 


Output Total 1200 


 


Balance -760 400








Chest good air entry bilaterally regular heart sounds nontender abdomen soft, 

bilateral pedal edema some improvement.


No diarrhea





- Medications


Medications: 


 Current Medications





Albuterol/Ipratropium (Duoneb 3 Mg/0.5 Mg (3 Ml) Ud)  3 ml INH RQ6 [facility]


   Last Admin: 05/07/18 13:08 Dose:  3 ml


Aspirin (Aspirin Chewable)  81 mg PO DAILY [facility]


   Last Admin: 05/07/18 09:07 Dose:  81 mg


Dabigatran (Pradaxa)  75 mg PO DAILY [facility]


   Last Admin: 05/07/18 09:08 Dose:  75 mg


Diphenhydramine HCl (Benadryl)  25 mg PO HS PRN


   PRN Reason: Insomnia


   Last Admin: 05/03/18 21:08 Dose:  25 mg


Famotidine (Pepcid)  20 mg PO DAILY [facility]


   Last Admin: 05/07/18 09:07 Dose:  20 mg


Furosemide (Lasix)  20 mg IVP Q12 [facility]


   Last Admin: 05/07/18 09:07 Dose:  Not Given


Isosorbide Mononitrate (Imdur Er)  30 mg PO DAILY [facility]


   Last Admin: 05/07/18 09:07 Dose:  Not Given


Losartan Potassium (Cozaar)  25 mg PO DAILY [facility]


   Last Admin: 05/07/18 09:07 Dose:  Not Given











- Labs


Labs: 


 





 05/03/18 07:11 





 05/03/18 07:11 





 











PT  17.9 SECONDS (9.7-12.2)  H  04/30/18  12:38    


 


INR  1.5   04/30/18  12:38    


 


APTT  56 SECONDS (21-34)  H  04/30/18  12:38    














Assessment and Plan


(1) Systolic heart failure secondary to hypertension


Assessment & Plan: 


Patient with a systolic heart failure


Hypertension


Hypertensive heart disease with a possible CAD


Currently on diuretics, anti-coagulation.


Continue the current treatment.


Patient will be needing rehabilitation.


Patient is agreeing


Diuretics combined and home oxygen


Status: Acute   





(2) Hypertension


Status: Acute

## 2019-01-07 ENCOUNTER — HOSPITAL ENCOUNTER (INPATIENT)
Dept: HOSPITAL 31 - C.ER | Age: 84
LOS: 9 days | Discharge: NURSING, REHAB OR LONG-TERM CARE | DRG: 291 | End: 2019-01-16
Attending: INTERNAL MEDICINE | Admitting: INTERNAL MEDICINE
Payer: COMMERCIAL

## 2019-01-07 DIAGNOSIS — Z95.0: ICD-10-CM

## 2019-01-07 DIAGNOSIS — Z79.4: ICD-10-CM

## 2019-01-07 DIAGNOSIS — I42.0: ICD-10-CM

## 2019-01-07 DIAGNOSIS — R18.8: ICD-10-CM

## 2019-01-07 DIAGNOSIS — I25.10: ICD-10-CM

## 2019-01-07 DIAGNOSIS — I27.20: ICD-10-CM

## 2019-01-07 DIAGNOSIS — D72.829: ICD-10-CM

## 2019-01-07 DIAGNOSIS — Z79.82: ICD-10-CM

## 2019-01-07 DIAGNOSIS — I08.3: ICD-10-CM

## 2019-01-07 DIAGNOSIS — I13.0: Primary | ICD-10-CM

## 2019-01-07 DIAGNOSIS — E11.22: ICD-10-CM

## 2019-01-07 DIAGNOSIS — I50.23: ICD-10-CM

## 2019-01-07 DIAGNOSIS — N18.3: ICD-10-CM

## 2019-01-07 DIAGNOSIS — M19.90: ICD-10-CM

## 2019-01-07 DIAGNOSIS — I50.84: ICD-10-CM

## 2019-01-07 DIAGNOSIS — Z99.81: ICD-10-CM

## 2019-01-07 DIAGNOSIS — I82.542: ICD-10-CM

## 2019-01-07 DIAGNOSIS — Z75.1: ICD-10-CM

## 2019-01-07 LAB
ALBUMIN SERPL-MCNC: 3.9 G/DL (ref 3.5–5)
ALBUMIN/GLOB SERPL: 1.1 {RATIO} (ref 1–2.1)
ALT SERPL-CCNC: 31 U/L (ref 21–72)
APTT BLD: 34 SECONDS (ref 21–34)
AST SERPL-CCNC: 40 U/L (ref 17–59)
BASOPHILS # BLD AUTO: 0 K/UL (ref 0–0.2)
BASOPHILS NFR BLD: 0.3 % (ref 0–2)
BILIRUB UR-MCNC: NEGATIVE MG/DL
BNP SERPL-MCNC: (no result) PG/ML (ref 0–900)
BUN SERPL-MCNC: 43 MG/DL (ref 9–20)
CALCIUM SERPL-MCNC: 9.7 MG/DL (ref 8.6–10.4)
EOSINOPHIL # BLD AUTO: 0 K/UL (ref 0–0.7)
EOSINOPHIL NFR BLD: 0 % (ref 0–4)
ERYTHROCYTE [DISTWIDTH] IN BLOOD BY AUTOMATED COUNT: 15.6 % (ref 11.5–14.5)
GFR NON-AFRICAN AMERICAN: > 60
GLUCOSE UR STRIP-MCNC: NORMAL MG/DL
HGB BLD-MCNC: 13.9 G/DL (ref 12–18)
INR PPP: 1.5
LEUKOCYTE ESTERASE UR-ACNC: (no result) LEU/UL
LYMPHOCYTE: 8 % (ref 20–40)
LYMPHOCYTES # BLD AUTO: 0.8 K/UL (ref 1–4.3)
LYMPHOCYTES NFR BLD AUTO: 7.4 % (ref 20–40)
MCH RBC QN AUTO: 33.9 PG (ref 27–31)
MCHC RBC AUTO-ENTMCNC: 32.7 G/DL (ref 33–37)
MCV RBC AUTO: 103.6 FL (ref 80–94)
MONOCYTE: 15 % (ref 0–10)
MONOCYTES # BLD: 1.6 K/UL (ref 0–0.8)
MONOCYTES NFR BLD: 14.8 % (ref 0–10)
NEUTROPHILS # BLD: 8.6 K/UL (ref 1.8–7)
NEUTROPHILS NFR BLD AUTO: 76 % (ref 50–75)
NEUTROPHILS NFR BLD AUTO: 77.5 % (ref 50–75)
NRBC BLD AUTO-RTO: 0 % (ref 0–2)
PH UR STRIP: 5 [PH] (ref 5–8)
PLATELET # BLD EST: NORMAL 10*3/UL
PLATELET # BLD: 177 K/UL (ref 130–400)
PMV BLD AUTO: 9.2 FL (ref 7.2–11.7)
PROT UR STRIP-MCNC: NEGATIVE MG/DL
PROTHROMBIN TIME: 16.8 SECONDS (ref 9.7–12.2)
RBC # BLD AUTO: 4.1 MIL/UL (ref 4.4–5.9)
RBC # UR STRIP: NEGATIVE /UL
SP GR UR STRIP: 1.01 (ref 1–1.03)
SQUAMOUS EPITHIAL: < 1 /HPF (ref 0–5)
TOTAL CELLS COUNTED BLD: 100
UROBILINOGEN UR-MCNC: NORMAL MG/DL (ref 0.2–1)
VARIANT LYMPHS NFR BLD MANUAL: 1 % (ref 0–0)
WBC # BLD AUTO: 11.1 K/UL (ref 4.8–10.8)

## 2019-01-07 RX ADMIN — ROSUVASTATIN CALCIUM SCH MG: 5 TABLET, FILM COATED ORAL at 23:11

## 2019-01-07 RX ADMIN — HUMAN INSULIN SCH: 100 INJECTION, SOLUTION SUBCUTANEOUS at 23:11

## 2019-01-07 NOTE — C.PDOC
History Of Present Illness





84 year old male with PMHx of HTN, CAD, and CHF (s/p pacemaker), DM presents to 

ED for complaints of worsening bilateral lower leg swelling that began 1 week 

ago. Patient reports he has been off of his lasix for the past few days because 

he ran out of it but he was able to get the prescription today and he took 3 

pills that he states are 10mg each at 8AM. Patient is also complaining of 

abdominal swelling. On oxygen at home by [place], continuously at night and 

intermittently throughout the day. Last admitted 04/2018 for CHF exacerbation. 

Denies fever, chills, chest pain, SOB, palpitations, abdominal pain, nausea, 

vomiting, dizziness, headache, dizziness, back pain, or any other associated 

symptoms.





PMD/Cardiologist: 


* Dr. Raymond











Time Seen by Provider: 01/07/19 15:40


Chief Complaint (Nursing): Lower Extremity Problem/Injury


History Per: Patient


History/Exam Limitations: no limitations


Onset/Duration Of Symptoms: Days


Current Symptoms Are (Timing): Still Present


Recent travel outside of the [place]: No





Past Medical History


Reviewed: Historical Data, Nursing Documentation, Vital Signs


Vital Signs: 





                                Last Vital Signs











Temp  97.4 F L  01/07/19 15:37


 


Pulse  94 H  01/07/19 15:37


 


Resp  20   01/07/19 15:37


 


BP  123/84   01/07/19 15:37


 


Pulse Ox  91 L  01/07/19 15:37














- Medical History


PMH: CAD, CHF, HTN


Surgical History: Pacemaker


Family History: States: No Known Family Hx





- Social History


Hx Alcohol Use: Yes


Hx Substance Use: No





- Immunization History


Hx Tetanus Toxoid Vaccination: No


Hx Influenza Vaccination: No


Hx Pneumococcal Vaccination: No





Review Of Systems


Except As Marked, All Systems Reviewed And Found Negative.


Constitutional: Negative for: Fever, Chills


Eyes: Negative for: Vision Change


ENT: Negative for: Ear Pain, Nose Pain, Nose Congestion, Throat Pain


Cardiovascular: Negative for: Chest Pain, Palpitations


Respiratory: Negative for: Cough, Shortness of Breath, Hemoptysis


Gastrointestinal: Positive for: Other (Abdominal swelling).  Negative for: 

Nausea, Vomiting, Abdominal Pain, Constipation


Genitourinary: Negative for: Dysuria, Frequency


Musculoskeletal: Negative for: Neck Pain, Back Pain


Skin: Positive for: Other (Bilateral lower leg swelling)


Neurological: Negative for: Weakness, Numbness, Altered Mental Status, Headache,

Dizziness





Physical Exam





- Physical Exam


Appears: Well, Non-toxic, No Acute Distress


Skin: Normal Color, Warm, Dry, No Rash


Head: Atraumatic, Normacephalic


Eye(s): bilateral: Normal Inspection, PERRL, EOMI


Oral Mucosa: Moist


Neck: Normal ROM, Supple


Chest: Symmetrical, No Tenderness, Other (Pacemaker left chest wall )


Cardiovascular: Rhythm Regular, No Murmur


Respiratory: Decreased Breath Sounds (bilaterally), Rales (Bilateral bases of 

lungs ), No Rhonchi, No Wheezing


Gastrointestinal/Abdominal: Bowel Sounds (Active/normal ), No Tenderness, 

Distention, No Guarding, No Rebound


Back: Normal Inspection, No CVA Tenderness


Extremity: Normal ROM, Pedal Edema (3+ bilateral lower legs, 1+ bilateral distal

arms), Capillary Refill (<2s)


Extremity: Bilateral: Atraumatic, Normal Color And Temperature, Normal ROM


Pulses: Left Radial: Normal, Right Radial: Normal


Neurological/Psych: Oriented x3, Normal Speech, Normal Cognition, Normal Motor, 

Normal Sensation, Other (No focal deficits )


Gait: Steady





ED Course And Treatment





- Laboratory Results


Result Diagrams: 


                                 01/07/19 16:30





                                 01/07/19 16:30


O2 Sat by Pulse Oximetry: 91 (RA)


Pulse Ox Interpretation: Abnormal





Medical Decision Making


Medical Decision Making: 


Initial Plan:


* CBC, CMP


* Troponin


* BNP


* EKG


* CXR


* Lasix





On initial exam, patient resting comfortably in stretcher in no acute distress. 

No SOB, speaking in full sentences without difficulty. Physical exam reveals 

signs of volume overload including pitting edema of bilateral upper and lower 

extremities, abdominal distention, and bilateral basilar rales. 





CBC: mild leukocytosis at 11.1


CMP: unremarkable


BNP: 57,000





EKG paced, per baseline


CXR shows cardiomegaly, pulmonary venous congestion





18:00


Case discussed with Dr. Escoto, covering for Dr. Lennon who accepts patient 

for inpatient telemetry admission with diagnosis of CHF exacerbation.





Disposition





- Disposition


Disposition: HOSPITALIZED


Disposition Time: 18:00


Condition: STABLE





- Clinical Impression


Clinical Impression: 


 CHF exacerbation, Leukocytosis








- PA / NP / Resident Statement


MD/DO has reviewed & agrees with the documentation as recorded.





- Scribe Statement


The provider has reviewed the documentation as recorded by the Bhavani BARAJAS





All medical record entries made by the Scribe were at my direction and 

personally dictated by me. I have reviewed the chart and agree that the record 

accurately reflects my personal performance of the history, physical exam, 

medical decision making, and the department course for this patient. I have also

personally directed, reviewed, and agree with the discharge instructions and 

disposition.tamia

## 2019-01-07 NOTE — C.PDOC
Chief Complaint (Nursing): Lower Extremity Problem/Injury





Past Medical History


Vital Signs: 





                                Last Vital Signs











Temp  97.4 F L  01/07/19 15:37


 


Pulse  94 H  01/07/19 15:37


 


Resp  20   01/07/19 15:37


 


BP  123/84   01/07/19 15:37


 


Pulse Ox  91 L  01/07/19 15:37














- Medical History


PMH: CHF, HTN





- Social History


Hx Alcohol Use: Yes


Hx Substance Use: No





- Immunization History


Hx Tetanus Toxoid Vaccination: No


Hx Influenza Vaccination: No


Hx Pneumococcal Vaccination: No





ED Course And Treatment


O2 Sat by Pulse Oximetry: 91





Disposition





- Disposition

## 2019-01-07 NOTE — CP.PCM.HP
History of Present Illness





- History of Present Illness


History of Present Illness: 





Chief complaint:


Bilateral leg swelling





HPI:


84-year-old male with a history of hypertension, history of  pacemaker who came 

to the emergency room with a gradually worsening leg swelling.


According to the patient's wife recently he started having increasing weakness 

in the legs.


He is able to walk down on the steps, but unable to get up.


Increasing leg swelling more on the right side than the left side noted.


He was also having some shortness of breath.


He denies any chest pain.


He did not have any fever.


But complaining of some cough.


He gets easily fatigued and tired





PMD Dr. Lennon.











Past medical history:


Hypertension, diabetes, history of gout, history of congestive heart failure in 

the past, also has a history of  pacemaker placement in the past


Allergies no known drug allergy


Surgical history pacemaker


Family history noncontributory


Personal history:


Non-smoker nonalcoholic.


Lives with wife.





Review of system:


Patient is feeling somewhat weakness.


Not eating well.


Poor appetite noted.


Weakness generalized noted.


Cough minimally noted.


Complaining of shortness of breath also even at rest.


No abdominal pain.


Denies any diarrhea





Bilateral leg swelling.


More on the right side





On examination:


Vital signs:


Noted.


Patient also has a mild hypoxia.


Chest bilateral diffuse wheezing and rhonchi noted.


Heart sounds noted


Abdomen soft, tenderness in the right upper quadrant noted.





Bilateral leg edema and leg swelling noted.


More on the right side compared to the left side.








Labs reviewed


A chest x-ray showing evidence of a pacemaker, mild CHF pattern.


EKG pacemaker rhythm noted.


Elevated highly proBNP noted.


Mild elevation of the bilirubin noted.


WBC 11.1.


Hemoglobin 13.9 platelet is 177 renal functions is prerenal noted.


INR is 1.5





Patient's home medication includes:


Aspirin 81 mg daily


Coreg 3.125 mg twice daily


Albuterol, ipratropium


Lunesta 1 mg daily


Isosorbide mononitrate 30 mg daily


Singular 10 mg at bedtime.


Temazepam 7.5 mg at bedtime


Tradjenta 5 mg daily





Assessment and recognition:


84-year-old male with a history of chronic heart failure.


In the past the patient had a systolic heart failure.


Now admitted with the possible worsening systolic heart failure with bilateral 

pedal edema


Also associated with a symptomatic orthopnea.


Highly elevated proBNP level noted.


History of pacemaker.


Hypertension.


Diabetes.


Severe osteoarthritis.


Overall prognosis is poor


We will start the patient on diuretics.


Oxygen.


Fall precautions.


DVT GI prophylaxis.


We will continue the physical therapy possibly.


We will follow the patient








Present on Admission





- Present on Admission


Any Indicators Present on Admission: No


History of DVT/PE: No


History of Uncontrolled Diabetes: No


Urinary Catheter: No


Decubitus Ulcer Present: No





Past Patient History





- Past Medical History & Family History


Past Medical History?: Yes





- Past Social History


Smoking Status: Never Smoked





- CARDIAC


Hx Congestive Heart Failure: Yes


Hx Hypertension: Yes


Hx Pacemaker: Yes





- ENDOCRINE/METABOLIC


Hx Diabetes Mellitus Type 2: Yes





- MUSCULOSKELETAL/RHEUMATOLOGICAL


Hx Falls: No





- PSYCHIATRIC


Hx Substance Use: No





- SURGICAL HISTORY


Hx Orthopedic Surgery: Yes (right hip)





- ANESTHESIA


Hx Anesthesia: Yes


Hx Anesthesia Reactions: No





Meds


Allergies/Adverse Reactions: 


                                    Allergies











Allergy/AdvReac Type Severity Reaction Status Date / Time


 


No Known Allergies Allergy   Verified 01/07/19 15:42














Results





- Vital Signs


Recent Vital Signs: 





                                Last Vital Signs











Temp  97.4 F L  01/07/19 15:37


 


Pulse  91 H  01/07/19 18:50


 


Resp  20   01/07/19 18:50


 


BP  104/70   01/07/19 18:50


 


Pulse Ox  100   01/07/19 18:50














- Labs


Result Diagrams: 


                                 01/07/19 16:30





                                 01/07/19 16:30


Labs: 





                         Laboratory Results - last 24 hr











  01/07/19 01/07/19 01/07/19





  16:30 16:30 16:30


 


WBC  11.1 H  


 


RBC  4.10 L  


 


Hgb  13.9  D  


 


Hct  42.4  


 


MCV  103.6 H D  


 


MCH  33.9 H  


 


MCHC  32.7 L  


 


RDW  15.6 H  


 


Plt Count  177  D  


 


MPV  9.2  


 


Neut % (Auto)  77.5 H  


 


Lymph % (Auto)  7.4 L  


 


Mono % (Auto)  14.8 H  


 


Eos % (Auto)  0.0  


 


Baso % (Auto)  0.3  


 


Neut # (Auto)  8.6 H  


 


Lymph # (Auto)  0.8 L  


 


Mono # (Auto)  1.6 H  


 


Eos # (Auto)  0.0  


 


Baso # (Auto)  0.0  


 


Neutrophils % (Manual)  76 H  


 


Lymphocytes % (Manual)  8 L  


 


Reactive Lymphs %  1 H  


 


Monocytes % (Manual)  15 H  


 


Platelet Estimate  Normal  


 


PT    16.8 H


 


INR    1.5


 


APTT    34


 


Sodium   138 


 


Potassium   4.9 


 


Chloride   98 


 


Carbon Dioxide   32 H 


 


Anion Gap   14 


 


BUN   43 H 


 


Creatinine   1.1 


 


Est GFR ( Amer)   > 60 


 


Est GFR (Non-Af Amer)   > 60 


 


Random Glucose   96 


 


Calcium   9.7 


 


Total Bilirubin   2.5 H 


 


AST   40 


 


ALT   31 


 


Alkaline Phosphatase   87 


 


Troponin I   0.0480 


 


NT-Pro-B Natriuret Pep   21150 H 


 


Total Protein   7.4 


 


Albumin   3.9 


 


Globulin   3.5 


 


Albumin/Globulin Ratio   1.1

## 2019-01-07 NOTE — RAD
HISTORY:

 SOB, h/o CHF 



COMPARISON:

Chest x-ray performed 4/30/18 



TECHNIQUE:

Chest, one view.



FINDINGS:

Examination limited by habitus.



LUNGS:

Mild pulmonary venous congestion.  No focal consolidation.



Please note that chest x-ray has limited sensitivity for the 

detection of pulmonary masses.



PLEURA:

No significant pleural effusion identified. No definite pneumothorax .



CARDIOVASCULAR:

Cardiomegaly.  Dual lead left-sided pacemaker.  No significant 

atherosclerotic calcifications. 



OSSEOUS STRUCTURES:

Degenerative changes.



VISUALIZED UPPER ABDOMEN:

Unremarkable.



OTHER FINDINGS:

None.



IMPRESSION:

Cardiomegaly.  Dual lead left-sided pacemaker.  Mild pulmonary venous 

congestion.

## 2019-01-07 NOTE — CP.PCM.CON
History of Present Illness





- History of Present Illness


History of Present Illness: 





Patient seen and evaluated


Admitted for acute on Chronic systolic CHf


Continue IV Lasix





Past Patient History





- Past Medical History & Family History


Past Medical History?: Yes





- Past Social History


Smoking Status: Never Smoked





- CARDIAC


Hx Congestive Heart Failure: Yes


Hx Hypertension: Yes


Hx Pacemaker: Yes





- ENDOCRINE/METABOLIC


Hx Diabetes Mellitus Type 2: Yes





- MUSCULOSKELETAL/RHEUMATOLOGICAL


Hx Falls: No





- PSYCHIATRIC


Hx Substance Use: No





- SURGICAL HISTORY


Hx Orthopedic Surgery: Yes (right hip)





- ANESTHESIA


Hx Anesthesia: Yes


Hx Anesthesia Reactions: No





Meds


Allergies/Adverse Reactions: 


                                    Allergies











Allergy/AdvReac Type Severity Reaction Status Date / Time


 


No Known Allergies Allergy   Verified 01/07/19 15:42














- Medications


Medications: 


                               Current Medications





Albuterol/Ipratropium (Duoneb 3 Mg/0.5 Mg (3 Ml) Ud)  3 ml INH RQ6 [facility]


Aspirin (Aspirin Chewable)  81 mg PO DAILY [facility]


Carvedilol (Coreg)  3.125 mg PO Q12 [facility]


   Last Admin: 01/07/19 23:11 Dose:  3.125 mg


Dextrose (Dextrose 50% Inj)  0 ml IV STAT PRN; Protocol


   PRN Reason: Hypoglycemia Protocol


Dextrose (Glutose 15)  0 gm PO ONCE PRN; Protocol


   PRN Reason: Hypoglycemia Protocol


Furosemide (Lasix)  40 mg IVP DAILY [facility]


Glucagon (Glucagen Diagnostic Kit)  0 mg IM STAT PRN; Protocol


   PRN Reason: Hypoglycemia Protocol


Heparin Sodium (Porcine) (Heparin)  5,000 units SC Q8 [facility]


   Last Admin: 01/07/19 23:11 Dose:  5,000 units


Dextrose (Dextrose 5% In Water 1000 Ml)  1,000 mls @ 0 mls/hr IV .Q0M PRN; 

Protocol


   PRN Reason: Hypoglycemia Protocol


Insulin Human Regular (Novolin R)  0 unit SC ACHS [facility]; Protocol


   Last Admin: 01/07/19 23:11 Dose:  Not Given


Montelukast Sodium (Singulair)  10 mg PO HS [facility]


   Last Admin: 01/07/19 23:11 Dose:  10 mg


Pantoprazole Sodium (Protonix Inj)  40 mg IVP DAILY [facility]


Rosuvastatin Calcium (Crestor)  2.5 mg PO HS [facility]


   Last Admin: 01/07/19 23:11 Dose:  2.5 mg











Results





- Vital Signs


Recent Vital Signs: 


                                Last Vital Signs











Temp  97.8 F   01/07/19 21:50


 


Pulse  85   01/07/19 21:50


 


Resp  18   01/07/19 21:50


 


BP  111/75   01/07/19 21:50


 


Pulse Ox  96   01/07/19 21:50














- Labs


Result Diagrams: 


                                 01/08/19 07:06





                                 01/08/19 07:06


Labs: 


                         Laboratory Results - last 24 hr











  01/07/19 01/07/19 01/07/19





  16:30 16:30 16:30


 


WBC  11.1 H  


 


RBC  4.10 L  


 


Hgb  13.9  D  


 


Hct  42.4  


 


MCV  103.6 H D  


 


MCH  33.9 H  


 


MCHC  32.7 L  


 


RDW  15.6 H  


 


Plt Count  177  D  


 


MPV  9.2  


 


Neut % (Auto)  77.5 H  


 


Lymph % (Auto)  7.4 L  


 


Mono % (Auto)  14.8 H  


 


Eos % (Auto)  0.0  


 


Baso % (Auto)  0.3  


 


Neut # (Auto)  8.6 H  


 


Lymph # (Auto)  0.8 L  


 


Mono # (Auto)  1.6 H  


 


Eos # (Auto)  0.0  


 


Baso # (Auto)  0.0  


 


Neutrophils % (Manual)  76 H  


 


Lymphocytes % (Manual)  8 L  


 


Reactive Lymphs %  1 H  


 


Monocytes % (Manual)  15 H  


 


Platelet Estimate  Normal  


 


PT    16.8 H


 


INR    1.5


 


APTT    34


 


Sodium   138 


 


Potassium   4.9 


 


Chloride   98 


 


Carbon Dioxide   32 H 


 


Anion Gap   14 


 


BUN   43 H 


 


Creatinine   1.1 


 


Est GFR ( Amer)   > 60 


 


Est GFR (Non-Af Amer)   > 60 


 


POC Glucose (mg/dL)   


 


Random Glucose   96 


 


Calcium   9.7 


 


Total Bilirubin   2.5 H 


 


AST   40 


 


ALT   31 


 


Alkaline Phosphatase   87 


 


Troponin I   0.0480 


 


NT-Pro-B Natriuret Pep   80824 H 


 


Total Protein   7.4 


 


Albumin   3.9 


 


Globulin   3.5 


 


Albumin/Globulin Ratio   1.1 


 


Urine Color   


 


Urine Clarity   


 


Urine pH   


 


Ur Specific Gravity   


 


Urine Protein   


 


Urine Glucose (UA)   


 


Urine Ketones   


 


Urine Blood   


 


Urine Nitrate   


 


Urine Bilirubin   


 


Urine Urobilinogen   


 


Ur Leukocyte Esterase   


 


Urine WBC (Auto)   


 


Ur Squamous Epith Cells   


 


Hyaline Casts   














  01/07/19 01/07/19 01/07/19





  18:43 21:28 23:10


 


WBC   


 


RBC   


 


Hgb   


 


Hct   


 


MCV   


 


MCH   


 


MCHC   


 


RDW   


 


Plt Count   


 


MPV   


 


Neut % (Auto)   


 


Lymph % (Auto)   


 


Mono % (Auto)   


 


Eos % (Auto)   


 


Baso % (Auto)   


 


Neut # (Auto)   


 


Lymph # (Auto)   


 


Mono # (Auto)   


 


Eos # (Auto)   


 


Baso # (Auto)   


 


Neutrophils % (Manual)   


 


Lymphocytes % (Manual)   


 


Reactive Lymphs %   


 


Monocytes % (Manual)   


 


Platelet Estimate   


 


PT   


 


INR   


 


APTT   


 


Sodium   


 


Potassium   


 


Chloride   


 


Carbon Dioxide   


 


Anion Gap   


 


BUN   


 


Creatinine   


 


Est GFR ( Amer)   


 


Est GFR (Non-Af Amer)   


 


POC Glucose (mg/dL)   79  89


 


Random Glucose   


 


Calcium   


 


Total Bilirubin   


 


AST   


 


ALT   


 


Alkaline Phosphatase   


 


Troponin I   


 


NT-Pro-B Natriuret Pep   


 


Total Protein   


 


Albumin   


 


Globulin   


 


Albumin/Globulin Ratio   


 


Urine Color  Yellow  


 


Urine Clarity  Clear  


 


Urine pH  5.0  


 


Ur Specific Gravity  1.009  


 


Urine Protein  Negative  


 


Urine Glucose (UA)  Normal  


 


Urine Ketones  Negative  


 


Urine Blood  Negative  


 


Urine Nitrate  Negative  


 


Urine Bilirubin  Negative  


 


Urine Urobilinogen  Normal  


 


Ur Leukocyte Esterase  Neg  


 


Urine WBC (Auto)  1  


 


Ur Squamous Epith Cells  < 1  


 


Hyaline Casts  11-20 H

## 2019-01-08 LAB
ALBUMIN SERPL-MCNC: 3.4 G/DL (ref 3.5–5)
ALBUMIN/GLOB SERPL: 1.1 {RATIO} (ref 1–2.1)
ALT SERPL-CCNC: 27 U/L (ref 21–72)
ANISOCYTOSIS BLD QL SMEAR: SLIGHT
AST SERPL-CCNC: 35 U/L (ref 17–59)
BASOPHILS # BLD AUTO: 0 K/UL (ref 0–0.2)
BASOPHILS NFR BLD: 0.1 % (ref 0–2)
BNP SERPL-MCNC: (no result) PG/ML (ref 0–900)
BUN SERPL-MCNC: 45 MG/DL (ref 9–20)
CALCIUM SERPL-MCNC: 9.5 MG/DL (ref 8.6–10.4)
CK MB SERPL-MCNC: 1.28 NG/ML (ref 0–3.38)
EOSINOPHIL # BLD AUTO: 0 K/UL (ref 0–0.7)
EOSINOPHIL NFR BLD: 0.4 % (ref 0–4)
ERYTHROCYTE [DISTWIDTH] IN BLOOD BY AUTOMATED COUNT: 15.5 % (ref 11.5–14.5)
GFR NON-AFRICAN AMERICAN: 58
HGB BLD-MCNC: 13 G/DL (ref 12–18)
LYMPHOCYTE: 4 % (ref 20–40)
LYMPHOCYTES # BLD AUTO: 0.6 K/UL (ref 1–4.3)
LYMPHOCYTES NFR BLD AUTO: 6.4 % (ref 20–40)
MACROCYTES BLD QL SMEAR: SLIGHT
MCH RBC QN AUTO: 34.6 PG (ref 27–31)
MCHC RBC AUTO-ENTMCNC: 33.6 G/DL (ref 33–37)
MCV RBC AUTO: 103 FL (ref 80–94)
MONOCYTE: 9 % (ref 0–10)
MONOCYTES # BLD: 1.6 K/UL (ref 0–0.8)
MONOCYTES NFR BLD: 16.1 % (ref 0–10)
NEUTROPHILS # BLD: 7.8 K/UL (ref 1.8–7)
NEUTROPHILS NFR BLD AUTO: 77 % (ref 50–75)
NEUTROPHILS NFR BLD AUTO: 87 % (ref 50–75)
NRBC BLD AUTO-RTO: 0 % (ref 0–2)
PLATELET # BLD EST: (no result) 10*3/UL
PLATELET # BLD: 180 K/UL (ref 130–400)
PMV BLD AUTO: 9.6 FL (ref 7.2–11.7)
RBC # BLD AUTO: 3.75 MIL/UL (ref 4.4–5.9)
TOTAL CELLS COUNTED BLD: 100
WBC # BLD AUTO: 10.1 K/UL (ref 4.8–10.8)

## 2019-01-08 RX ADMIN — IPRATROPIUM BROMIDE AND ALBUTEROL SULFATE SCH ML: .5; 3 SOLUTION RESPIRATORY (INHALATION) at 19:20

## 2019-01-08 RX ADMIN — IPRATROPIUM BROMIDE AND ALBUTEROL SULFATE SCH ML: .5; 3 SOLUTION RESPIRATORY (INHALATION) at 01:30

## 2019-01-08 RX ADMIN — HUMAN INSULIN SCH: 100 INJECTION, SOLUTION SUBCUTANEOUS at 07:43

## 2019-01-08 RX ADMIN — HUMAN INSULIN SCH: 100 INJECTION, SOLUTION SUBCUTANEOUS at 22:29

## 2019-01-08 RX ADMIN — ROSUVASTATIN CALCIUM SCH MG: 5 TABLET, FILM COATED ORAL at 22:29

## 2019-01-08 RX ADMIN — HUMAN INSULIN SCH: 100 INJECTION, SOLUTION SUBCUTANEOUS at 12:02

## 2019-01-08 RX ADMIN — HUMAN INSULIN SCH: 100 INJECTION, SOLUTION SUBCUTANEOUS at 18:39

## 2019-01-08 RX ADMIN — IPRATROPIUM BROMIDE AND ALBUTEROL SULFATE SCH ML: .5; 3 SOLUTION RESPIRATORY (INHALATION) at 07:41

## 2019-01-08 RX ADMIN — IPRATROPIUM BROMIDE AND ALBUTEROL SULFATE SCH ML: .5; 3 SOLUTION RESPIRATORY (INHALATION) at 13:39

## 2019-01-08 NOTE — CP.PCM.PN
<Desi Sneed - Last Filed: 01/08/19 17:08>





Subjective





- Date & Time of Evaluation


Date of Evaluation: 01/08/19


Time of Evaluation: 13:26





- Subjective


Subjective: 





PGY3 progress note for cardiology





Pt seen and examined at bedside.  No acute events overnight. Today, pt continues

to be SOB on NC currently.  Pt denies having any CP, abd pain, N/V/D/C, F/C, 

cough.  Pt complainis of abdominal distention as well. 





Objective





- Vital Signs/Intake and Output


Vital Signs (last 24 hours): 


                                        











Temp Pulse Resp BP Pulse Ox


 


 97.3 F L  65   20   95/55 L  99 


 


 01/08/19 07:48  01/08/19 07:48  01/08/19 07:48  01/08/19 10:03  01/08/19 07:48











- Medications


Medications: 


                               Current Medications





Acetaminophen (Tylenol 325mg Tab)  650 mg PO Q6 PRN


   PRN Reason: Pain, moderate (4-7)


Albuterol/Ipratropium (Duoneb 3 Mg/0.5 Mg (3 Ml) Ud)  3 ml INH RQ6 [facility]


   Last Admin: 01/08/19 07:41 Dose:  3 ml


Aspirin (Aspirin Chewable)  81 mg PO DAILY [facility]


   Last Admin: 01/08/19 10:02 Dose:  81 mg


Carvedilol (Coreg)  3.125 mg PO Q12 [facility]


   Last Admin: 01/08/19 10:03 Dose:  Not Given


Furosemide (Lasix)  40 mg IVP DAILY [facility]


   Last Admin: 01/08/19 10:03 Dose:  Not Given


Heparin Sodium (Porcine) (Heparin)  5,000 units SC Q8 [facility]


   Last Admin: 01/08/19 05:37 Dose:  5,000 units


Insulin Human Regular (Novolin R)  0 unit SC ACHS [facility]; Protocol


   Last Admin: 01/08/19 12:02 Dose:  Not Given


Lidocaine (Lidoderm)  1 ea TD DAILY [facility]


   Last Admin: 01/08/19 10:03 Dose:  1 ea


Montelukast Sodium (Singulair)  10 mg PO HS [facility]


   Last Admin: 01/07/19 23:11 Dose:  10 mg


Pantoprazole Sodium (Protonix Inj)  40 mg IVP DAILY [facility]


   Last Admin: 01/08/19 10:04 Dose:  40 mg


Rosuvastatin Calcium (Crestor)  2.5 mg PO HS [facility]


   Last Admin: 01/07/19 23:11 Dose:  2.5 mg











- Labs


Labs: 


                                        





                                 01/08/19 07:06 





                                 01/08/19 07:06 





                                        











PT  16.8 SECONDS (9.7-12.2)  H  01/07/19  16:30    


 


INR  1.5   01/07/19  16:30    


 


APTT  34 SECONDS (21-34)   01/07/19  16:30    














- Constitutional


Appears: Non-toxic, No Acute Distress





- Head Exam


Head Exam: ATRAUMATIC, NORMOCEPHALIC





- ENT Exam


ENT Exam: Mucous Membranes Moist





- Respiratory Exam


Respiratory Exam: Rhonchi.  absent: Accessory Muscle Use, Rales, Wheezes, 

Respiratory Distress





- Cardiovascular Exam


Cardiovascular Exam: REGULAR RHYTHM, +S1, +S2





- GI/Abdominal Exam


GI & Abdominal Exam: Distended, Soft, Normal Bowel Sounds.  absent: Firm, 

Guarding, Rigid, Tenderness, Organomegaly





- Neurological Exam


Neurological Exam: Alert, Awake, Oriented x3





- Psychiatric Exam


Psychiatric exam: Normal Affect, Normal Mood





- Skin


Skin Exam: Dry, Intact, Normal Color, Warm





Assessment and Plan





- Assessment and Plan (Free Text)


Assessment: 





84 year old male with past medical history of hypertension, diabetes, Dialted 

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 

exacerbation [place]NP of 553[address].  Repeat proBNP was 25663. CXR on admission 

showed mild venous congestion and cardiomegaly.  EKG on admission showed paced 

rhythm.  





CHF exacerbation


- Echo from 4/2018 showed 4 chamber dilation with global hypokenesis.  Severe 

diastolic dysfunction.  RV RV systolic function moderately reduced.  Severe 

pulmonary HTN and mild AR, mod MR and severe TR. EF 22%


- Pt states pacemaker was interrogated a few months ago.  


- Will consider repeating echo 


- Continue diuresis with lasixs 40 IVP qd


- Continue home coreg dose at 3.125 mg po Q12


- Continue Aspirin 81 mg po qd





HTN


- Continue Coreg and Lasixs





Diabetes


- will check lipid panel and HgbA1c


- Continue Crestor 2.5 mg po hs





Case will be discussed with attending, Dr. Vazquez  








<Familia Vazquez - Last Filed: 01/09/19 05:39>





Objective





- Vital Signs/Intake and Output


Vital Signs (last 24 hours): 


                                        











Temp Pulse Resp BP Pulse Ox


 


 98.0 F   90   18   100/64   95 


 


 01/08/19 23:14  01/08/19 23:14  01/08/19 23:14  01/08/19 23:14  01/08/19 23:14








Intake and Output: 


                                        











 01/08/19 01/09/19





 18:59 06:59


 


Intake Total 300 


 


Output Total 200 200


 


Balance 100 -200














- Medications


Medications: 


                               Current Medications





Acetaminophen (Tylenol 325mg Tab)  650 mg PO Q6 PRN


   PRN Reason: Pain, moderate (4-7)


Albuterol/Ipratropium (Duoneb 3 Mg/0.5 Mg (3 Ml) Ud)  3 ml INH RQ6 [facility]


   Last Admin: 01/09/19 01:27 Dose:  3 ml


Aspirin (Aspirin Chewable)  81 mg PO DAILY [facility]


   Last Admin: 01/08/19 10:02 Dose:  81 mg


Carvedilol (Coreg)  3.125 mg PO Q12 [facility]


   Last Admin: 01/08/19 22:29 Dose:  3.125 mg


Furosemide (Lasix)  40 mg IVP DAILY [facility]


   Last Admin: 01/08/19 10:03 Dose:  Not Given


Heparin Sodium (Porcine) (Heparin)  5,000 units SC Q8 [facility]


   Last Admin: 01/09/19 05:12 Dose:  5,000 units


Insulin Human Regular (Novolin R)  0 unit SC ACHS [facility]; Protocol


   Last Admin: 01/08/19 22:29 Dose:  Not Given


Lidocaine (Lidoderm)  1 ea TD DAILY [facility]


   Last Admin: 01/08/19 10:03 Dose:  1 ea


Montelukast Sodium (Singulair)  10 mg PO HS [facility]


   Last Admin: 01/08/19 22:29 Dose:  10 mg


Pantoprazole Sodium (Protonix Inj)  40 mg IVP DAILY [facility]


   Last Admin: 01/08/19 10:04 Dose:  40 mg


Rosuvastatin Calcium (Crestor)  2.5 mg PO HS [facility]


   Last Admin: 01/08/19 22:29 Dose:  2.5 mg











- Labs


Labs: 


                                        





                                 01/08/19 07:06 





                                 01/08/19 07:06 





                                        











PT  16.8 SECONDS (9.7-12.2)  H  01/07/19  16:30    


 


INR  1.5   01/07/19  16:30    


 


APTT  34 SECONDS (21-34)   01/07/19  16:30    














Assessment and Plan





- Assessment and Plan (Free Text)


Assessment: 


Patient seen and evaluated personally by me. Plan of care d/w the medical 

resident and as documented.

## 2019-01-08 NOTE — RAD
Date of service: 



01/08/2019



PROCEDURE:  Right Knee Radiographs.



HISTORY:

swelling



COMPARISON:

None.



FINDINGS:



BONES:

No fracture seen.  Mild spurring medial tibial spine 



JOINTS:

Osteoarthrosis patellofemoral and medial femoral tibial compartments 

most notably affected. 



JOINT EFFUSION:

Small suprapatellar joint effusion possible.  Posterior joint 

effusion small also possible.







OTHER FINDINGS:

Atherosclerotic vascular calcifications present. 



Slight varus orientation. 



Subcutaneous edema especially posteriorly-lymphedema and/or 

cellulitis 9 gas-forming are considerations.  Correlate clinically. 



IMPRESSION:

No fracture or lytic lesion.  No periosteal reaction. 



Subcutaneous reticulated edema circumferential most accentuated on 

this exam posteriorly; no gas-forming cellulitis. 



Osteoarthrosis. Atherosclerotic vascular calcifications present.

## 2019-01-08 NOTE — CARD
--------------- APPROVED REPORT --------------





Date of service: 01/07/2019



EKG Measurement

Heart [id number]

MD 166P

[id number]

[id number]

[id number]



<Conclusion>

Atrial-sensed ventricular-paced rhythm

Abnormal ECG

## 2019-01-09 RX ADMIN — HUMAN INSULIN SCH: 100 INJECTION, SOLUTION SUBCUTANEOUS at 11:34

## 2019-01-09 RX ADMIN — HUMAN INSULIN SCH: 100 INJECTION, SOLUTION SUBCUTANEOUS at 22:02

## 2019-01-09 RX ADMIN — HUMAN INSULIN SCH: 100 INJECTION, SOLUTION SUBCUTANEOUS at 07:33

## 2019-01-09 RX ADMIN — IPRATROPIUM BROMIDE AND ALBUTEROL SULFATE SCH ML: .5; 3 SOLUTION RESPIRATORY (INHALATION) at 01:27

## 2019-01-09 RX ADMIN — HUMAN INSULIN SCH: 100 INJECTION, SOLUTION SUBCUTANEOUS at 17:06

## 2019-01-09 RX ADMIN — IPRATROPIUM BROMIDE AND ALBUTEROL SULFATE SCH ML: .5; 3 SOLUTION RESPIRATORY (INHALATION) at 13:50

## 2019-01-09 RX ADMIN — ROSUVASTATIN CALCIUM SCH MG: 5 TABLET, FILM COATED ORAL at 21:25

## 2019-01-09 RX ADMIN — IPRATROPIUM BROMIDE AND ALBUTEROL SULFATE SCH ML: .5; 3 SOLUTION RESPIRATORY (INHALATION) at 09:31

## 2019-01-09 RX ADMIN — IPRATROPIUM BROMIDE AND ALBUTEROL SULFATE SCH ML: .5; 3 SOLUTION RESPIRATORY (INHALATION) at 20:22

## 2019-01-09 NOTE — CP.PCM.PN
<Desi Sneed - Last Filed: 01/09/19 14:57>





Subjective





- Date & Time of Evaluation


Date of Evaluation: 01/09/19


Time of Evaluation: 14:57





- Subjective


Subjective: 





Progress note for cardiology 





Pt seen and examined at bedside this am.  Pt is resting in bed comfortably.  

Denies having any CP, SOB, abd pain, N/V/D/c, F/C.  Pt continues to complain of 

right LE pain, especially at the knee.   





Objective





- Vital Signs/Intake and Output


Vital Signs (last 24 hours): 


                                        











Temp Pulse Resp BP Pulse Ox


 


 98.1 F   89   20   103/69   94 L


 


 01/09/19 07:11  01/09/19 07:11  01/09/19 07:11  01/09/19 11:04  01/09/19 14:08








Intake and Output: 


                                        











 01/09/19 01/09/19





 06:59 18:59


 


Intake Total  200


 


Output Total 200 100


 


Balance -200 100














- Medications


Medications: 


                               Current Medications





Acetaminophen (Tylenol 325mg Tab)  650 mg PO Q6 PRN


   PRN Reason: Pain, moderate (4-7)


Albuterol/Ipratropium (Duoneb 3 Mg/0.5 Mg (3 Ml) Ud)  3 ml INH RQ6 [facility]


   Last Admin: 01/09/19 13:50 Dose:  3 ml


Aspirin (Aspirin Chewable)  81 mg PO DAILY [facility]


   Last Admin: 01/09/19 10:14 Dose:  81 mg


Carvedilol (Coreg)  3.125 mg PO Q12 [facility]


   Last Admin: 01/09/19 10:15 Dose:  3.125 mg


Furosemide (Lasix)  40 mg IVP DAILY [facility]


   Last Admin: 01/09/19 11:04 Dose:  40 mg


Heparin Sodium (Porcine) (Heparin)  5,000 units SC Q8 [facility]


   Last Admin: 01/09/19 14:14 Dose:  5,000 units


Insulin Human Regular (Novolin R)  0 unit SC ACHS [facility]; Protocol


   Last Admin: 01/09/19 11:34 Dose:  Not Given


Lidocaine (Lidoderm)  1 ea TD DAILY [facility]


   Last Admin: 01/09/19 10:20 Dose:  1 ea


Montelukast Sodium (Singulair)  10 mg PO HS [facility]


   Last Admin: 01/08/19 22:29 Dose:  10 mg


Pantoprazole Sodium (Protonix Inj)  40 mg IVP DAILY [facility]


   Last Admin: 01/09/19 10:14 Dose:  40 mg


Rosuvastatin Calcium (Crestor)  2.5 mg PO HS [facility]


   Last Admin: 01/08/19 22:29 Dose:  2.5 mg











- Labs


Labs: 


                                        





                                 01/08/19 07:06 





                                 01/08/19 07:06 





                                        











PT  16.8 SECONDS (9.7-12.2)  H  01/07/19  16:30    


 


INR  1.5   01/07/19  16:30    


 


APTT  34 SECONDS (21-34)   01/07/19  16:30    














- Constitutional


Appears: Non-toxic, No Acute Distress





- Head Exam


Head Exam: ATRAUMATIC, NORMOCEPHALIC





- ENT Exam


ENT Exam: Mucous Membranes Moist





- Respiratory Exam


Respiratory Exam: Rhonchi, Wheezes (expiratory ).  absent: Accessory Muscle Use,

Rales, Respiratory Distress





- Cardiovascular Exam


Cardiovascular Exam: REGULAR RHYTHM, +S1, +S2.  absent: Gallop, Rubs, Murmur





- GI/Abdominal Exam


GI & Abdominal Exam: Distended, Soft.  absent: Rigid, Tenderness





- Extremities Exam


Extremities Exam: Pedal Edema.  absent: Tenderness





- Neurological Exam


Neurological Exam: Alert, Awake, Oriented x3





- Psychiatric Exam


Psychiatric exam: Normal Affect, Normal Mood





- Skin


Skin Exam: Dry, Intact, Normal Color, Warm





Assessment and Plan





- Assessment and Plan (Free Text)


Assessment: 





84 year old male with past medical history of hypertension, diabetes, Dialted 

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 

exacerbation with proBNP of 99998.  Repeat proBNP was 85682. CXR on admission 

showed mild venous congestion and cardiomegaly.  EKG on admission showed paced 

rhythm.  





CHF exacerbation


- Echo from 4/2018 showed 4 chamber dilation with global hypokenesis.  Severe 

diastolic dysfunction.  RV RV systolic function moderately reduced.  Severe 

pulmonary HTN and mild AR, mod MR and severe TR. EF 22%


- Pt states pacemaker was interrogated a few months ago.  


- Continue diuresis with lasixs 40 IVP qd


- Continue home coreg dose at 3.125 mg po Q12


- Continue Aspirin 81 mg po qd


- Will consider placing pt on ACE/ARB or Entresto.    





HTN


- Continue Coreg and Lasixs





Diabetes


- will check lipid panel and HgbA1c


- Continue Crestor 2.5 mg po hs





Case discussed with attending, Familia Marcus - Last Filed: 01/09/19 22:22>





Objective





- Vital Signs/Intake and Output


Vital Signs (last 24 hours): 


                                        











Temp Pulse Resp BP Pulse Ox


 


 98.2 F   88   24   104/69   95 


 


 01/09/19 15:53  01/09/19 22:14  01/09/19 21:26  01/09/19 21:26  01/09/19 21:26








Intake and Output: 


                                        











 01/09/19 01/10/19





 18:59 06:59


 


Intake Total 200 


 


Output Total 100 


 


Balance 100 














- Medications


Medications: 


                               Current Medications





Acetaminophen (Tylenol 325mg Tab)  650 mg PO Q6 PRN


   PRN Reason: Pain, moderate (4-7)


Albuterol/Ipratropium (Duoneb 3 Mg/0.5 Mg (3 Ml) Ud)  3 ml INH RQ6 [facility]


   Last Admin: 01/09/19 20:22 Dose:  3 ml


Aspirin (Aspirin Chewable)  81 mg PO DAILY [facility]


   Last Admin: 01/09/19 10:14 Dose:  81 mg


Carvedilol (Coreg)  3.125 mg PO Q12 [facility]


   Last Admin: 01/09/19 21:26 Dose:  3.125 mg


Furosemide (Lasix)  40 mg IVP DAILY [facility]


   Last Admin: 01/09/19 11:04 Dose:  40 mg


Heparin Sodium (Porcine) (Heparin)  5,000 units SC Q8 [facility]


   Last Admin: 01/09/19 21:25 Dose:  5,000 units


Insulin Human Regular (Novolin R)  0 unit SC ACHS [facility]; Protocol


   Last Admin: 01/09/19 22:02 Dose:  Not Given


Lidocaine (Lidoderm)  1 ea TD DAILY [facility]


   Last Admin: 01/09/19 10:20 Dose:  1 ea


Montelukast Sodium (Singulair)  10 mg PO HS [facility]


   Last Admin: 01/09/19 21:25 Dose:  10 mg


Pantoprazole Sodium (Protonix Inj)  40 mg IVP DAILY [facility]


   Last Admin: 01/09/19 10:14 Dose:  40 mg


Rosuvastatin Calcium (Crestor)  2.5 mg PO HS [facility]


   Last Admin: 01/09/19 21:25 Dose:  2.5 mg











- Labs


Labs: 


                                        





                                 01/08/19 07:06 





                                 01/08/19 07:06 





                                        











PT  16.8 SECONDS (9.7-12.2)  H  01/07/19  16:30    


 


INR  1.5   01/07/19  16:30    


 


APTT  34 SECONDS (21-34)   01/07/19  16:30    














Assessment and Plan





- Assessment and Plan (Free Text)


Assessment: 


Patient seen and evaluated personally by me. Plan of care d/w the medical 

resident and as documented

## 2019-01-09 NOTE — CP.PCM.PN
Subjective





- Date & Time of Evaluation


Date of Evaluation: 01/09/19


Time of Evaluation: 09:02





- Subjective


Subjective: 





The patient is slightly feeling better.


He has no swelling in the legs on the left side.


Significant improvement.


But on the right leg patient has a edema, and also there is a pain.


He has a x-ray of the right knee showing evidence of a synovial swelling.


Edema likely.





On examination:


Vital signs stable.


Chest good air entry.


Regular [place] noted.


Abdomen soft nontender.


Edema bilaterally more on the right side





Patient had a DVT study yesterday, there is a suspected right lower leg abnormal

venous study, but the results are pending.


Most likely patient has a possibility of DVT.


We will start the patient on low-dose anticoagulation.


Continue the Lasix.


Physical therapy evaluation cardiology follow-up and will follow the patient





Objective





- Vital Signs/Intake and Output


Vital Signs (last 24 hours): 


                                        











Temp Pulse Resp BP Pulse Ox


 


 98.1 F   89   20   110/75   95 


 


 01/09/19 07:11  01/09/19 07:11  01/09/19 07:11  01/09/19 07:11  01/09/19 07:11








Intake and Output: 


                                        











 01/09/19 01/09/19





 06:59 18:59


 


Intake Total  200


 


Output Total 200 100


 


Balance -200 100














- Medications


Medications: 


                               Current Medications





Acetaminophen (Tylenol 325mg Tab)  650 mg PO Q6 PRN


   PRN Reason: Pain, moderate (4-7)


Albuterol/Ipratropium (Duoneb 3 Mg/0.5 Mg (3 Ml) Ud)  3 ml INH RQ6 [facility]


   Last Admin: 01/09/19 01:27 Dose:  3 ml


Aspirin (Aspirin Chewable)  81 mg PO DAILY [facility]


   Last Admin: 01/08/19 10:02 Dose:  81 mg


Carvedilol (Coreg)  3.125 mg PO Q12 [facility]


   Last Admin: 01/08/19 22:29 Dose:  3.125 mg


Furosemide (Lasix)  40 mg IVP DAILY [facility]


   Last Admin: 01/08/19 10:03 Dose:  Not Given


Heparin Sodium (Porcine) (Heparin)  5,000 units SC Q8 [facility]


   Last Admin: 01/09/19 05:12 Dose:  5,000 units


Insulin Human Regular (Novolin R)  0 unit SC ACHS [facility]; Protocol


   Last Admin: 01/09/19 07:33 Dose:  Not Given


Lidocaine (Lidoderm)  1 ea TD DAILY [facility]


   Last Admin: 01/08/19 10:03 Dose:  1 ea


Montelukast Sodium (Singulair)  10 mg PO HS [facility]


   Last Admin: 01/08/19 22:29 Dose:  10 mg


Pantoprazole Sodium (Protonix Inj)  40 mg IVP DAILY [facility]


   Last Admin: 01/08/19 10:04 Dose:  40 mg


Rosuvastatin Calcium (Crestor)  2.5 mg PO HS [facility]


   Last Admin: 01/08/19 22:29 Dose:  2.5 mg











- Labs


Labs: 


                                        





                                 01/08/19 07:06 





                                 01/08/19 07:06 





                                        











PT  16.8 SECONDS (9.7-12.2)  H  01/07/19  16:30    


 


INR  1.5   01/07/19  16:30    


 


APTT  34 SECONDS (21-34)   01/07/19  16:30

## 2019-01-09 NOTE — CP.PCM.PN
Subjective





- Date & Time of Evaluation


Date of Evaluation: 01/08/19


Time of Evaluation: 08:59





- Subjective


Subjective: 





Patient complaining of right knee pain.


Right knee swelling noted.


Also there is a significant swelling of the right side of the legs.


Less shortness of breath noted.


No chest pain.


Minimal expiratory wheezing noted





On examination:


Vital signs stable.


Chest good air entry.


Wheezing noted.





Assessment and recommendation:


Patient is a 84-year-old male with a history of advanced heart disease.


Heart failure.


Decompensated heart failure.


I would like to rule out any fracture on the right knee, x-ray ordered.


Lasix to be continued a cardiology evaluation and will follow the patient





Objective





- Vital Signs/Intake and Output


Vital Signs (last 24 hours): 


                                        











Temp Pulse Resp BP Pulse Ox


 


 98.1 F   89   20   110/75   95 


 


 01/09/19 07:11  01/09/19 07:11  01/09/19 07:11  01/09/19 07:11  01/09/19 07:11








Intake and Output: 


                                        











 01/09/19 01/09/19





 06:59 18:59


 


Intake Total  200


 


Output Total 200 100


 


Balance -200 100














- Medications


Medications: 


                               Current Medications





Acetaminophen (Tylenol 325mg Tab)  650 mg PO Q6 PRN


   PRN Reason: Pain, moderate (4-7)


Albuterol/Ipratropium (Duoneb 3 Mg/0.5 Mg (3 Ml) Ud)  3 ml INH RQ6 [facility]


   Last Admin: 01/09/19 01:27 Dose:  3 ml


Aspirin (Aspirin Chewable)  81 mg PO DAILY [facility]


   Last Admin: 01/08/19 10:02 Dose:  81 mg


Carvedilol (Coreg)  3.125 mg PO Q12 [facility]


   Last Admin: 01/08/19 22:29 Dose:  3.125 mg


Furosemide (Lasix)  40 mg IVP DAILY [facility]


   Last Admin: 01/08/19 10:03 Dose:  Not Given


Heparin Sodium (Porcine) (Heparin)  5,000 units SC Q8 [facility]


   Last Admin: 01/09/19 05:12 Dose:  5,000 units


Insulin Human Regular (Novolin R)  0 unit SC ACHS [facility]; Protocol


   Last Admin: 01/09/19 07:33 Dose:  Not Given


Lidocaine (Lidoderm)  1 ea TD DAILY [facility]


   Last Admin: 01/08/19 10:03 Dose:  1 ea


Montelukast Sodium (Singulair)  10 mg PO HS [facility]


   Last Admin: 01/08/19 22:29 Dose:  10 mg


Pantoprazole Sodium (Protonix Inj)  40 mg IVP DAILY [facility]


   Last Admin: 01/08/19 10:04 Dose:  40 mg


Rosuvastatin Calcium (Crestor)  2.5 mg PO HS [facility]


   Last Admin: 01/08/19 22:29 Dose:  2.5 mg











- Labs


Labs: 


                                        





                                 01/08/19 07:06 





                                 01/08/19 07:06 





                                        











PT  16.8 SECONDS (9.7-12.2)  H  01/07/19  16:30    


 


INR  1.5   01/07/19  16:30    


 


APTT  34 SECONDS (21-34)   01/07/19  16:30

## 2019-01-10 LAB
ALBUMIN SERPL-MCNC: 3.3 G/DL (ref 3.5–5)
ALBUMIN/GLOB SERPL: 1 {RATIO} (ref 1–2.1)
ALT SERPL-CCNC: 29 U/L (ref 21–72)
ANISOCYTOSIS BLD QL SMEAR: SLIGHT
AST SERPL-CCNC: 40 U/L (ref 17–59)
BASOPHILS # BLD AUTO: 0 K/UL (ref 0–0.2)
BASOPHILS NFR BLD: 0.1 % (ref 0–2)
BUN SERPL-MCNC: 56 MG/DL (ref 9–20)
CALCIUM SERPL-MCNC: 9.4 MG/DL (ref 8.6–10.4)
EOSINOPHIL # BLD AUTO: 0 K/UL (ref 0–0.7)
EOSINOPHIL NFR BLD: 0 % (ref 0–4)
ERYTHROCYTE [DISTWIDTH] IN BLOOD BY AUTOMATED COUNT: 15.4 % (ref 11.5–14.5)
GFR NON-AFRICAN AMERICAN: 53
HGB BLD-MCNC: 12.7 G/DL (ref 12–18)
LYMPHOCYTE: 4 % (ref 20–40)
LYMPHOCYTES # BLD AUTO: 0.4 K/UL (ref 1–4.3)
LYMPHOCYTES NFR BLD AUTO: 3.6 % (ref 20–40)
MACROCYTES BLD QL SMEAR: SLIGHT
MCH RBC QN AUTO: 34.3 PG (ref 27–31)
MCHC RBC AUTO-ENTMCNC: 32.9 G/DL (ref 33–37)
MCV RBC AUTO: 104.4 FL (ref 80–94)
MONOCYTE: 15 % (ref 0–10)
MONOCYTES # BLD: 1.9 K/UL (ref 0–0.8)
MONOCYTES NFR BLD: 15 % (ref 0–10)
NEUTROPHILS # BLD: 10.1 K/UL (ref 1.8–7)
NEUTROPHILS NFR BLD AUTO: 81 % (ref 50–75)
NEUTROPHILS NFR BLD AUTO: 81.3 % (ref 50–75)
NRBC BLD AUTO-RTO: 0 % (ref 0–2)
NRBC BLD AUTO-RTO: 1 % (ref 0–0)
PLATELET # BLD EST: NORMAL 10*3/UL
PLATELET # BLD: 195 K/UL (ref 130–400)
PMV BLD AUTO: 9.8 FL (ref 7.2–11.7)
POIKILOCYTOSIS BLD QL SMEAR: SLIGHT
RBC # BLD AUTO: 3.71 MIL/UL (ref 4.4–5.9)
TOTAL CELLS COUNTED BLD: 100
WBC # BLD AUTO: 12.4 K/UL (ref 4.8–10.8)

## 2019-01-10 RX ADMIN — HUMAN INSULIN SCH: 100 INJECTION, SOLUTION SUBCUTANEOUS at 16:54

## 2019-01-10 RX ADMIN — IPRATROPIUM BROMIDE AND ALBUTEROL SULFATE SCH ML: .5; 3 SOLUTION RESPIRATORY (INHALATION) at 20:00

## 2019-01-10 RX ADMIN — IPRATROPIUM BROMIDE AND ALBUTEROL SULFATE SCH: .5; 3 SOLUTION RESPIRATORY (INHALATION) at 13:34

## 2019-01-10 RX ADMIN — HUMAN INSULIN SCH: 100 INJECTION, SOLUTION SUBCUTANEOUS at 22:00

## 2019-01-10 RX ADMIN — ROSUVASTATIN CALCIUM SCH MG: 5 TABLET, FILM COATED ORAL at 21:20

## 2019-01-10 RX ADMIN — IPRATROPIUM BROMIDE AND ALBUTEROL SULFATE SCH: .5; 3 SOLUTION RESPIRATORY (INHALATION) at 03:50

## 2019-01-10 RX ADMIN — HUMAN INSULIN SCH: 100 INJECTION, SOLUTION SUBCUTANEOUS at 07:51

## 2019-01-10 RX ADMIN — HUMAN INSULIN SCH: 100 INJECTION, SOLUTION SUBCUTANEOUS at 12:02

## 2019-01-10 RX ADMIN — IPRATROPIUM BROMIDE AND ALBUTEROL SULFATE SCH ML: .5; 3 SOLUTION RESPIRATORY (INHALATION) at 07:39

## 2019-01-10 NOTE — VASCLAB
Date of service: 



01/08/2019



PROCEDURE:  Lower Extremity Venous Duplex Exam.



HISTORY:

dvt 



PRIORS:

None. 



TECHNIQUE:

Bilateral common femoral, femoral, popliteal and posterior tibial, 

peroneal and great saphenous veins were evaluated. Flow was assessed 

with color Doppler, compressibility, assessment of phasic flow and 

augmentation response.



Report prepared by   EZEQUIEL Flores, RVT



FINDINGS:



RIGHT:

1. Common Femoral Vein: 



1.1. Compressibility - Fully compressible: Thrombus -  None : Flow - 

Phasic: Augmentation -Normal: Reflux - None.



2. Femoral Vein:



2.1. Compressibility - Fully compressible: Thrombus -  None : Flow - 

Phasic: Augmentation -Normal: Reflux - None.



3. Popliteal Vein: 



3.1. Compressibility - Fully compressible: Thrombus - None :  Flow - 

Phasic: Augmentation -Normal: Reflux - None.



4. Posterior Tibial Vein: 



4.1. Compressibility - Fully compressible: Thrombus -  None: Flow - 

Phasic: Augmentation -Normal: Reflux - None.



5. Peroneal Vein:



5.1. Compressibility - Fully compressible: Thrombus -  None: Flow - 

Phasic: Augmentation -Normal: Reflux - None.



6. Great Saphenous Vein:

6.1. Compressibility - Fully compressible: Thrombus - None: Flow - 

Phasic: Augmentation - Normal: Reflux - None.





LEFT:

1. Common Femoral Vein:



1.1.  Compressibility - Fully compressible: Thrombus -  None: Flow - 

Phasic: Augmentation -Normal: Reflux - None.



2. Femoral Vein:



2.1.  Compressibility - Fully compressible: Thrombus -  None: Flow - 

Phasic: Augmentation -Normal: Reflux - None.



3. Popliteal Vein:



3.1.  Compressibility - Fully compressible: Thrombus -  None : Flow - 

Phasic: Augmentation -Normal: Reflux - None.



4. Posterior Tibial Vein:



4.1.  Compressibility - Incompressible: Thrombus -  Chronic: Flow - 

Absent : Augmentation - None: Reflux - None.



5. Peroneal Vein:



5.1.  Compressibility - Fully compressible: Thrombus -  None: Flow - 

Phasic: Augmentation -Normal: Reflux - None.



6. Great Saphenous Vein:

6.1.  Compressibility - Fully compressible: Thrombus -  None: Flow - 

Phasic: Augmentation - Normal: Reflux - None.





OTHER FINDINGS:  BRINDA Dumont notified about the findings.



IMPRESSION:

Right: 



No evidence of deep or superficial vein thrombosis of the right lower 

extremity. Normal valve function noted of the right side.     



Left: 



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 

reduction of the venous return.

## 2019-01-10 NOTE — CP.PCM.PN
Subjective





- Date & Time of Evaluation


Date of Evaluation: 01/10/19


Time of Evaluation: 19:15





- Subjective


Subjective: 





Pt seen and examined at bedside this am.  Pt is resting in bed comfortably.





Objective





- Constitutional


Appears: Non-toxic, No Acute Distress





- Head Exam


Head Exam: ATRAUMATIC, NORMOCEPHALIC





- ENT Exam


ENT Exam: Mucous Membranes Moist





- Respiratory Exam


Respiratory Exam: Rhonchi, Wheezes (expiratory ).  absent: Accessory Muscle Use,

Rales, Respiratory Distress





- Cardiovascular Exam


Cardiovascular Exam: REGULAR RHYTHM, +S1, +S2.  absent: Gallop, Rubs, Murmur





- GI/Abdominal Exam


GI & Abdominal Exam: Distended, Soft.  absent: Rigid, Tenderness





- Extremities Exam


Extremities Exam: Pedal Edema.  absent: Tenderness





- Neurological Exam


Neurological Exam: Alert, Awake, Oriented x3





- Psychiatric Exam


Psychiatric exam: Normal Affect, Normal Mood





- Skin


Skin Exam: Dry, Intact, Normal Color, Warm





Assessment and Plan





- Assessment and Plan (Free Text)


Assessment: 





84 year old male with past medical history of hypertension, diabetes, Dialted 

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 

exacerbation [place]BNP of 32092.  Repeat proBNP was 59410. CXR on admission 

showed mild venous congestion and cardiomegaly.  EKG on admission showed paced 

rhythm.  





CHF exacerbation


- Echo from 4/2018 showed 4 chamber dilation with global hypokenesis.  Severe 

diastolic dysfunction.  RV RV systolic function moderately reduced.  Severe 

pulmonary HTN and mild AR, mod MR and severe TR. EF 22%


- Pt states pacemaker was interrogated a few months ago.  


- Continue diuresis with lasixs 40 IVP qd


- Continue home coreg dose at 3.125 mg po Q12


- Continue Aspirin 81 mg po qd


- Will consider placing pt on ACE/ARB or Entresto.    





HTN


- Continue Coreg and Lasixs





Diabetes


- will check lipid panel and HgbA1c


- Continue Crestor 2.5 mg po hs





Objective





- Vital Signs/Intake and Output


Vital Signs (last 24 hours): 


                                        











Temp Pulse Resp BP Pulse Ox


 


 98.1 F   85   22   110/67   95 


 


 01/10/19 21:00  01/10/19 21:00  01/10/19 21:00  01/10/19 21:00  01/10/19 21:00








Intake and Output: 


                                        











 01/10/19 01/11/19





 18:59 06:59


 


Intake Total 250 


 


Output Total 300 


 


Balance -50 














- Medications


Medications: 


                               Current Medications





Acetaminophen (Tylenol 325mg Tab)  650 mg PO Q6 PRN


   PRN Reason: Pain, moderate (4-7)


Albuterol/Ipratropium (Duoneb 3 Mg/0.5 Mg (3 Ml) Ud)  3 ml INH RQ6 [facility]


   Last Admin: 01/10/19 20:00 Dose:  3 ml


Aspirin (Aspirin Chewable)  81 mg PO DAILY [facility]


   Last Admin: 01/10/19 09:39 Dose:  81 mg


Carvedilol (Coreg)  3.125 mg PO Q12 [facility]


   Last Admin: 01/10/19 21:20 Dose:  3.125 mg


Furosemide (Lasix)  40 mg IVP DAILY [facility]


   Last Admin: 01/10/19 09:41 Dose:  40 mg


Heparin Sodium (Porcine) (Heparin)  5,000 units SC Q8 [facility]


   Last Admin: 01/10/19 21:19 Dose:  5,000 units


Insulin Human Regular (Novolin R)  0 unit SC ACHS [facility]; Protocol


   Last Admin: 01/10/19 16:54 Dose:  Not Given


Lidocaine (Lidoderm)  1 ea TD DAILY [facility]


   Last Admin: 01/10/19 09:38 Dose:  1 ea


Montelukast Sodium (Singulair)  10 mg PO HS [facility]


   Last Admin: 01/10/19 21:20 Dose:  10 mg


Pantoprazole Sodium (Protonix Inj)  40 mg IVP DAILY [facility]


   Last Admin: 01/10/19 09:39 Dose:  40 mg


Rosuvastatin Calcium (Crestor)  2.5 mg PO HS [facility]


   Last Admin: 01/10/19 21:20 Dose:  2.5 mg











- Labs


Labs: 


                                        





                                 01/10/19 12:23 





                                 01/10/19 12:23 





                                        











PT  16.8 SECONDS (9.7-12.2)  H  01/07/19  16:30    


 


INR  1.5   01/07/19  16:30    


 


APTT  34 SECONDS (21-34)   01/07/19  16:30

## 2019-01-11 RX ADMIN — HUMAN INSULIN SCH: 100 INJECTION, SOLUTION SUBCUTANEOUS at 21:48

## 2019-01-11 RX ADMIN — HUMAN INSULIN SCH: 100 INJECTION, SOLUTION SUBCUTANEOUS at 17:34

## 2019-01-11 RX ADMIN — IPRATROPIUM BROMIDE AND ALBUTEROL SULFATE SCH: .5; 3 SOLUTION RESPIRATORY (INHALATION) at 03:05

## 2019-01-11 RX ADMIN — IPRATROPIUM BROMIDE AND ALBUTEROL SULFATE SCH ML: .5; 3 SOLUTION RESPIRATORY (INHALATION) at 08:32

## 2019-01-11 RX ADMIN — HUMAN INSULIN SCH: 100 INJECTION, SOLUTION SUBCUTANEOUS at 13:46

## 2019-01-11 RX ADMIN — HUMAN INSULIN SCH: 100 INJECTION, SOLUTION SUBCUTANEOUS at 07:32

## 2019-01-11 RX ADMIN — IPRATROPIUM BROMIDE AND ALBUTEROL SULFATE SCH ML: .5; 3 SOLUTION RESPIRATORY (INHALATION) at 19:50

## 2019-01-11 RX ADMIN — ROSUVASTATIN CALCIUM SCH MG: 5 TABLET, FILM COATED ORAL at 21:48

## 2019-01-12 LAB
BASOPHILS # BLD AUTO: 0 K/UL (ref 0–0.2)
BASOPHILS NFR BLD: 0.2 % (ref 0–2)
BUN SERPL-MCNC: 80 MG/DL (ref 9–20)
CALCIUM SERPL-MCNC: 8.5 MG/DL (ref 8.6–10.4)
EOSINOPHIL # BLD AUTO: 0 K/UL (ref 0–0.7)
EOSINOPHIL NFR BLD: 0 % (ref 0–4)
ERYTHROCYTE [DISTWIDTH] IN BLOOD BY AUTOMATED COUNT: 15.5 % (ref 11.5–14.5)
GFR NON-AFRICAN AMERICAN: 41
HGB BLD-MCNC: 13.3 G/DL (ref 12–18)
LYMPHOCYTE: 6 % (ref 20–40)
LYMPHOCYTES # BLD AUTO: 0.4 K/UL (ref 1–4.3)
LYMPHOCYTES NFR BLD AUTO: 2.5 % (ref 20–40)
MCH RBC QN AUTO: 34.3 PG (ref 27–31)
MCHC RBC AUTO-ENTMCNC: 33.1 G/DL (ref 33–37)
MCV RBC AUTO: 103.7 FL (ref 80–94)
MONOCYTE: 13 % (ref 0–10)
MONOCYTES # BLD: 2.2 K/UL (ref 0–0.8)
MONOCYTES NFR BLD: 12.9 % (ref 0–10)
NEUTROPHILS # BLD: 14.3 K/UL (ref 1.8–7)
NEUTROPHILS NFR BLD AUTO: 80 % (ref 50–75)
NEUTROPHILS NFR BLD AUTO: 84.4 % (ref 50–75)
NEUTS BAND NFR BLD: 1 % (ref 0–2)
NRBC BLD AUTO-RTO: 0.1 % (ref 0–2)
PLATELET # BLD EST: NORMAL 10*3/UL
PLATELET # BLD: 237 K/UL (ref 130–400)
PMV BLD AUTO: 9.6 FL (ref 7.2–11.7)
RBC # BLD AUTO: 3.88 MIL/UL (ref 4.4–5.9)
TOTAL CELLS COUNTED BLD: 100
WBC # BLD AUTO: 16.9 K/UL (ref 4.8–10.8)

## 2019-01-12 RX ADMIN — HUMAN INSULIN SCH: 100 INJECTION, SOLUTION SUBCUTANEOUS at 16:47

## 2019-01-12 RX ADMIN — IPRATROPIUM BROMIDE AND ALBUTEROL SULFATE SCH ML: .5; 3 SOLUTION RESPIRATORY (INHALATION) at 20:27

## 2019-01-12 RX ADMIN — ROSUVASTATIN CALCIUM SCH MG: 5 TABLET, FILM COATED ORAL at 21:57

## 2019-01-12 RX ADMIN — IPRATROPIUM BROMIDE AND ALBUTEROL SULFATE SCH ML: .5; 3 SOLUTION RESPIRATORY (INHALATION) at 14:02

## 2019-01-12 RX ADMIN — HUMAN INSULIN SCH: 100 INJECTION, SOLUTION SUBCUTANEOUS at 08:10

## 2019-01-12 RX ADMIN — IPRATROPIUM BROMIDE AND ALBUTEROL SULFATE SCH ML: .5; 3 SOLUTION RESPIRATORY (INHALATION) at 08:00

## 2019-01-12 RX ADMIN — HUMAN INSULIN SCH: 100 INJECTION, SOLUTION SUBCUTANEOUS at 21:35

## 2019-01-12 RX ADMIN — HUMAN INSULIN SCH: 100 INJECTION, SOLUTION SUBCUTANEOUS at 13:13

## 2019-01-12 NOTE — CP.PCM.PN
Subjective





- Date & Time of Evaluation


Date of Evaluation: 01/12/19


Time of Evaluation: 17:20





- Subjective


Subjective: 





Pt seen and evaluated.  No new complaints





Objective





- Constitutional


Appears: Non-toxic, No Acute Distress





- Head Exam


Head Exam: ATRAUMATIC, NORMOCEPHALIC





- ENT Exam


ENT Exam: Mucous Membranes Moist





- Respiratory Exam


Respiratory Exam: Rhonchi, Wheezes (expiratory ).  absent: Accessory Muscle Use,

Rales, Respiratory Distress





- Cardiovascular Exam


Cardiovascular Exam: REGULAR RHYTHM, +S1, +S2.  absent: Gallop, Rubs, Murmur





- GI/Abdominal Exam


GI & Abdominal Exam: Distended, Soft.  absent: Rigid, Tenderness





- Extremities Exam


Extremities Exam: Pedal Edema.  absent: Tenderness





- Neurological Exam


Neurological Exam: Alert, Awake, Oriented x3





- Psychiatric Exam


Psychiatric exam: Normal Affect, Normal Mood





- Skin


Skin Exam: Dry, Intact, Normal Color, Warm





Assessment and Plan





- Assessment and Plan (Free Text)


Assessment: 





84 year old male with past medical history of hypertension, diabetes, Dialted 

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 

exacerbation [place]BNP of 37548.  Repeat proBNP was 68732. CXR on admission 

showed mild venous congestion and cardiomegaly.  EKG on admission showed paced 

rhythm.  





CHF exacerbation


- Echo from 4/2018 showed 4 chamber dilation with global hypokenesis.  Severe 

diastolic dysfunction.  RV RV systolic function moderately reduced.  Severe 

pulmonary HTN and mild AR, mod MR and severe TR. EF 22%


- Pt states pacemaker was interrogated a few months ago.  


- Continue diuresis with lasixs 40 IVP qd


- Continue home coreg dose at 3.125 mg po Q12


- Continue Aspirin 81 mg po qd


- Will consider placing pt on ACE/ARB or Entresto.    





HTN


- Continue Coreg and Lasixs





Diabetes


- will check lipid panel and HgbA1c


- Continue Crestor 2.5 mg po hs





Patient awaiting Hospice care








Objective





- Vital Signs/Intake and Output


Vital Signs (last 24 hours): 


                                        











Temp Pulse Resp BP Pulse Ox


 


 98.0 F   88   22   119/75   95 


 


 01/12/19 15:00  01/12/19 18:10  01/12/19 18:10  01/12/19 18:48  01/12/19 18:10








Intake and Output: 


                                        











 01/12/19 01/13/19





 18:59 06:59


 


Intake Total 400 


 


Output Total 200 


 


Balance 200 














- Medications


Medications: 


                               Current Medications





Acetaminophen (Tylenol 325mg Tab)  650 mg PO Q6 PRN


   PRN Reason: Pain, moderate (4-7)


Albuterol/Ipratropium (Duoneb 3 Mg/0.5 Mg (3 Ml) Ud)  3 ml INH RQ6 [facility]


   Last Admin: 01/12/19 20:27 Dose:  3 ml


Aspirin (Aspirin Chewable)  81 mg PO DAILY [facility]


   Last Admin: 01/12/19 10:23 Dose:  81 mg


Carvedilol (Coreg)  3.125 mg PO Q12 [facility]


   Last Admin: 01/12/19 21:57 Dose:  3.125 mg


Furosemide (Lasix)  20 mg IVP DAILY [facility]


   Last Admin: 01/12/19 10:23 Dose:  20 mg


Heparin Sodium (Porcine) (Heparin)  5,000 units SC Q8 [facility]


   Last Admin: 01/12/19 22:00 Dose:  5,000 units


Insulin Human Regular (Novolin R)  0 unit SC ACHS [facility]; Protocol


   Last Admin: 01/12/19 21:35 Dose:  Not Given


Lidocaine (Lidoderm)  1 ea TD DAILY [facility]


   Last Admin: 01/12/19 10:23 Dose:  1 ea


Montelukast Sodium (Singulair)  10 mg PO HS [facility]


   Last Admin: 01/12/19 21:57 Dose:  10 mg


Pantoprazole Sodium (Protonix Inj)  40 mg IVP DAILY [facility]


   Last Admin: 01/12/19 10:23 Dose:  40 mg


Rosuvastatin Calcium (Crestor)  2.5 mg PO HS [facility]


   Last Admin: 01/12/19 21:57 Dose:  2.5 mg











- Labs


Labs: 


                                        





                                 01/12/19 16:32 





                                 01/12/19 16:32 





                                        











PT  16.8 SECONDS (9.7-12.2)  H  01/07/19  16:30    


 


INR  1.5   01/07/19  16:30    


 


APTT  34 SECONDS (21-34)   01/07/19  16:30

## 2019-01-12 NOTE — RAD
Date of service: 



01/12/2019



HISTORY:

 sob/ f/u 



COMPARISON:

01/07/2019. 



FINDINGS:



LUNGS:

There is moderate pulmonary venous congestion and fluid in the 

horizontal fissure.  There is patchy airspace disease in the right 

lower lobe. 



PLEURA:

Suspect small right pleural effusion.  No large left pleural 

effusion.  No pneumothorax. 



CARDIOVASCULAR:

There is severe cardiomegaly.  There is stable position of left-sided 

dual lead permanent pacing device.  No aortic atherosclerotic 

calcifications present. 



OSSEOUS STRUCTURES:

Within normal limits for the patient's age.



VISUALIZED UPPER ABDOMEN:

Normal.



OTHER FINDINGS:

None.



IMPRESSION:

Patchy airspace disease in the right lower lobe may represent 

subsegmental atelectasis/pneumonia. 



Severe cardiomegaly and moderate pulmonary venous congestion.

## 2019-01-13 RX ADMIN — HUMAN INSULIN SCH: 100 INJECTION, SOLUTION SUBCUTANEOUS at 12:04

## 2019-01-13 RX ADMIN — DEXTROSE AND SODIUM CHLORIDE SCH MLS/HR: 5; 450 INJECTION, SOLUTION INTRAVENOUS at 13:52

## 2019-01-13 RX ADMIN — HUMAN INSULIN SCH: 100 INJECTION, SOLUTION SUBCUTANEOUS at 16:25

## 2019-01-13 RX ADMIN — IPRATROPIUM BROMIDE AND ALBUTEROL SULFATE SCH ML: .5; 3 SOLUTION RESPIRATORY (INHALATION) at 19:46

## 2019-01-13 RX ADMIN — HUMAN INSULIN SCH: 100 INJECTION, SOLUTION SUBCUTANEOUS at 21:45

## 2019-01-13 RX ADMIN — IPRATROPIUM BROMIDE AND ALBUTEROL SULFATE SCH ML: .5; 3 SOLUTION RESPIRATORY (INHALATION) at 08:44

## 2019-01-13 RX ADMIN — HUMAN INSULIN SCH: 100 INJECTION, SOLUTION SUBCUTANEOUS at 07:30

## 2019-01-13 RX ADMIN — IPRATROPIUM BROMIDE AND ALBUTEROL SULFATE SCH ML: .5; 3 SOLUTION RESPIRATORY (INHALATION) at 01:59

## 2019-01-13 RX ADMIN — ROSUVASTATIN CALCIUM SCH: 5 TABLET, FILM COATED ORAL at 21:44

## 2019-01-13 RX ADMIN — IPRATROPIUM BROMIDE AND ALBUTEROL SULFATE SCH ML: .5; 3 SOLUTION RESPIRATORY (INHALATION) at 13:42

## 2019-01-13 NOTE — CT
Date of service: 



01/13/2019



PROCEDURE:  CT Abdomen and Pelvis without intravenous contrast



HISTORY:

ilius



COMPARISON:

None.



TECHNIQUE:

CT scan of the abdomen and pelvis was performed without 

administration of intravenous contrast. Oral contrast was not 

administered. Coronal and sagittal reformatted images were obtained. 

. 



Radiation dose:



Total exam DLP = 862.48 mGy-cm.



This CT exam was performed using one or more of the following dose 

reduction techniques: Automated exposure control, adjustment of the 

mA and/or kV according to patient size, and/or use of iterative 

reconstruction technique.



FINDINGS:



LOWER THORAX:

Moderate pleural effusions with compressive atelectasis in the lung 

bases.  Moderate cardiomegaly. 



LIVER:

Mild hepatomegaly.  No intrahepatic ductal dilatation. 



GALLBLADDER AND BILE DUCTS:

No calcified gallstones. The gallbladder is contracted.



PANCREAS:

Mild fatty atrophy.  No ductal dilatation.



SPLEEN:

Normal in size.



ADRENALS:

Normal in size. No discrete nodule. 



KIDNEYS AND URETERS:

Bilateral renal cortical atrophy.  No hydronephrosis.  There are 

punctate nonobstructing stones in the right kidney.  There is a 2.9 

cm simple cyst in the upper pole of the left kidney. 



VASCULATURE:

No aortic aneurysm. There are aortic atherosclerotic calcification or 

mural plaque present.



BOWEL:

There is severe gaseous distension of the stomach.  The small bowel 

loops are normal in caliber.  There is moderate amount of stool in 

the colon and fecal impaction in the rectum.  There is scattered 

colonic diverticulosis without CT evidence for acute diverticulitis.  

The small bowel loops are normal in caliber. The colon is normal in 

size.  No bowel obstruction. 



APPENDIX:

Normal appendix. 



PERITONEUM:

Large abdominal and pelvic ascites.  No free air. 



LYMPH NODES:

No enlarged lymph nodes.



BLADDER:

Well distended and grossly normal in appearance. 



REPRODUCTIVE:

The uterus is normal in size 



BONES:

Age indeterminate but likely chronic osteoporotic compression 

fracture in the L1 vertebral body.  Severe degenerative disc disease 

at L5-S1.  Status post left hip arthroplasty. 



OTHER FINDINGS:

There is severe diffuse anasarca.



IMPRESSION:

1.  Severe gaseous distension of the stomach.  Large abdominal and 

pelvic ascites.



2.  Colonic diverticulosis without CT evidence for acute 

diverticulitis. 



3.  Moderate pleural effusions. 



4.  Additional comments as described above.

## 2019-01-13 NOTE — CP.PCM.PN
Subjective





- Date & Time of Evaluation


Date of Evaluation: 01/11/19


Time of Evaluation: 09:00





- Subjective


Subjective: 





Patient complaining of pain in the lower abdominal area.


Patient was sitting up for a very short time, he was having some difficult time.


He is feeling somewhat sleepy at this time.


I had a discussion with the patient's family, and all 4 family members.


After multiple discussion they wanted to have hospice evaluate


Hospice evaluation consultation was called in.


We will continue the treatment at this time.


Overall prognosis very poor





Objective





- Vital Signs/Intake and Output


Vital Signs (last 24 hours): 


                                        











Temp Pulse Resp BP Pulse Ox


 


 97.5 F L  80   20   101/62   95 


 


 01/13/19 00:00  01/13/19 01:00  01/13/19 00:00  01/13/19 00:00  01/13/19 00:00








Intake and Output: 


                                        











 01/13/19 01/13/19





 06:59 18:59


 


Intake Total 0 


 


Output Total 250 


 


Balance -250 














- Medications


Medications: 


                               Current Medications





Acetaminophen (Tylenol 325mg Tab)  650 mg PO Q6 PRN


   PRN Reason: Pain, moderate (4-7)


Albuterol/Ipratropium (Duoneb 3 Mg/0.5 Mg (3 Ml) Ud)  3 ml INH RQ6 [facility]


   Last Admin: 01/13/19 08:44 Dose:  3 ml


Aspirin (Aspirin Chewable)  81 mg PO DAILY [facility]


   Last Admin: 01/12/19 10:23 Dose:  81 mg


Bisacodyl (Dulcolax)  10 mg MT ONCE ONE


   Stop: 01/13/19 09:01


Carvedilol (Coreg)  3.125 mg PO Q12 [facility]


   Last Admin: 01/12/19 21:57 Dose:  3.125 mg


Furosemide (Lasix)  20 mg IVP DAILY [facility]


   Last Admin: 01/12/19 10:23 Dose:  20 mg


Heparin Sodium (Porcine) (Heparin)  5,000 units SC Q8 [facility]


   Last Admin: 01/13/19 06:12 Dose:  5,000 units


Insulin Human Regular (Novolin R)  0 unit SC ACHS [facility]; Protocol


   Last Admin: 01/12/19 21:35 Dose:  Not Given


Lidocaine (Lidoderm)  1 ea TD DAILY [facility]


   Last Admin: 01/12/19 10:23 Dose:  1 ea


Montelukast Sodium (Singulair)  10 mg PO HS [facility]


   Last Admin: 01/12/19 21:57 Dose:  10 mg


Pantoprazole Sodium (Protonix Inj)  40 mg IVP DAILY [facility]


   Last Admin: 01/12/19 10:23 Dose:  40 mg


Rosuvastatin Calcium (Crestor)  2.5 mg PO HS [facility]


   Last Admin: 01/12/19 21:57 Dose:  2.5 mg











- Labs


Labs: 


                                        





                                 01/12/19 16:32 





                                 01/12/19 16:32 





                                        











PT  16.8 SECONDS (9.7-12.2)  H  01/07/19  16:30    


 


INR  1.5   01/07/19  16:30    


 


APTT  34 SECONDS (21-34)   01/07/19  16:30

## 2019-01-13 NOTE — RAD
Date of service: 



01/13/2019



HISTORY:

 ilius 



COMPARISON:

None available.



FINDINGS:



BOWEL:

There is severe gaseous distension of the stomach.  The small bowel 

loops are decompressed. 



BONES:

Normal.



OTHER FINDINGS:

None.



IMPRESSION:

Severe gaseous distension of the stomach which may be related to 

gastro paresis or ileus.

## 2019-01-13 NOTE — CP.PCM.PN
Subjective





- Date & Time of Evaluation


Date of Evaluation: 01/12/19


Time of Evaluation: 12:54





- Subjective


Subjective: 





Patient is somewhat weak, distress noted.


Abdominal distention present.


X-ray of the chest much clearer, but some CHF pattern noted.


Stomach distention present.


Lasix was given again.


We will continue to monitor.


Overall prognosis very poor





Objective





- Vital Signs/Intake and Output


Vital Signs (last 24 hours): 


                                        











Temp Pulse Resp BP Pulse Ox


 


 97.5 F L  80   20   97/6 L  95 


 


 01/13/19 00:00  01/13/19 01:00  01/13/19 00:00  01/13/19 11:24  01/13/19 00:00








Intake and Output: 


                                        











 01/13/19 01/13/19





 06:59 18:59


 


Intake Total 0 


 


Output Total 250 


 


Balance -250 














- Medications


Medications: 


                               Current Medications





Acetaminophen (Tylenol 325mg Tab)  650 mg PO Q6 PRN


   PRN Reason: Pain, moderate (4-7)


Albuterol/Ipratropium (Duoneb 3 Mg/0.5 Mg (3 Ml) Ud)  3 ml INH RQ6 [facility]


   Last Admin: 01/13/19 08:44 Dose:  3 ml


Aspirin (Aspirin Chewable)  81 mg PO DAILY [facility]


   Last Admin: 01/13/19 11:23 Dose:  Not Given


Carvedilol (Coreg)  3.125 mg PO Q12 [facility]


   Last Admin: 01/13/19 11:23 Dose:  Not Given


Furosemide (Lasix)  20 mg IVP DAILY [facility]


Heparin Sodium (Porcine) (Heparin)  5,000 units SC Q8 [facility]


   Last Admin: 01/13/19 06:12 Dose:  5,000 units


Insulin Human Regular (Novolin R)  0 unit SC ACHS [facility]; Protocol


   Last Admin: 01/13/19 12:04 Dose:  Not Given


Lidocaine (Lidoderm)  1 ea TD DAILY [facility]


   Last Admin: 01/13/19 10:59 Dose:  1 ea


Montelukast Sodium (Singulair)  10 mg PO HS [facility]


   Last Admin: 01/12/19 21:57 Dose:  10 mg


Pantoprazole Sodium (Protonix Inj)  40 mg IVP DAILY [facility]


   Last Admin: 01/13/19 11:00 Dose:  40 mg


Rosuvastatin Calcium (Crestor)  2.5 mg PO HS [facility]


   Last Admin: 01/12/19 21:57 Dose:  2.5 mg











- Labs


Labs: 


                                        





                                 01/12/19 16:32 





                                 01/12/19 16:32 





                                        











PT  16.8 SECONDS (9.7-12.2)  H  01/07/19  16:30    


 


INR  1.5   01/07/19  16:30    


 


APTT  34 SECONDS (21-34)   01/07/19  16:30

## 2019-01-13 NOTE — CP.PCM.PN
Subjective





- Date & Time of Evaluation


Date of Evaluation: 01/13/19


Time of Evaluation: 12:55





- Subjective


Subjective: 





Patient in respiratory distress minimally


Abdominal distention noted.


CAT scan of the abdomen and x-ray abdomen reviewed


Gaseous distention of the stomach noted.


The distention was extremely high


Emergency NG tube was placed.


Secretions was drained, placed on suction.


Patient has a thick secretions from the gastric content noted.


Likely bilious, also some fecal matter noted.


I kept the patient n.p.o. now.


We will start the patient on some low-dose IV fluid.


Patient also has large ascites in the abdomen.





On examination:


Mild distress


Saturation is 95%


Blood pressure 110/70


Abdominal distention is less than before


We will repeat the labs.


Renal evaluation.


Keep the NG tube low intermittent suction.  We will follow the patient





Objective





- Vital Signs/Intake and Output


Vital Signs (last 24 hours): 


                                        











Temp Pulse Resp BP Pulse Ox


 


 97.5 F L  80   20   97/6 L  95 


 


 01/13/19 00:00  01/13/19 01:00  01/13/19 00:00  01/13/19 11:24  01/13/19 00:00








Intake and Output: 


                                        











 01/13/19 01/13/19





 06:59 18:59


 


Intake Total 0 


 


Output Total 250 


 


Balance -250 














- Medications


Medications: 


                               Current Medications





Acetaminophen (Tylenol 325mg Tab)  650 mg PO Q6 PRN


   PRN Reason: Pain, moderate (4-7)


Albuterol/Ipratropium (Duoneb 3 Mg/0.5 Mg (3 Ml) Ud)  3 ml INH RQ6 [facility]


   Last Admin: 01/13/19 08:44 Dose:  3 ml


Aspirin (Aspirin Chewable)  81 mg PO DAILY [facility]


   Last Admin: 01/13/19 11:23 Dose:  Not Given


Carvedilol (Coreg)  3.125 mg PO Q12 [facility]


   Last Admin: 01/13/19 11:23 Dose:  Not Given


Furosemide (Lasix)  20 mg IVP DAILY [facility]


Heparin Sodium (Porcine) (Heparin)  5,000 units SC Q8 [facility]


   Last Admin: 01/13/19 06:12 Dose:  5,000 units


Insulin Human Regular (Novolin R)  0 unit SC ACHS [facility]; Protocol


   Last Admin: 01/13/19 12:04 Dose:  Not Given


Lidocaine (Lidoderm)  1 ea TD DAILY [facility]


   Last Admin: 01/13/19 10:59 Dose:  1 ea


Montelukast Sodium (Singulair)  10 mg PO HS [facility]


   Last Admin: 01/12/19 21:57 Dose:  10 mg


Pantoprazole Sodium (Protonix Inj)  40 mg IVP DAILY [facility]


   Last Admin: 01/13/19 11:00 Dose:  40 mg


Rosuvastatin Calcium (Crestor)  2.5 mg PO HS [facility]


   Last Admin: 01/12/19 21:57 Dose:  2.5 mg











- Labs


Labs: 


                                        





                                 01/12/19 16:32 





                                 01/12/19 16:32 





                                        











PT  16.8 SECONDS (9.7-12.2)  H  01/07/19  16:30    


 


INR  1.5   01/07/19  16:30    


 


APTT  34 SECONDS (21-34)   01/07/19  16:30

## 2019-01-13 NOTE — CP.PCM.PN
Subjective





- Date & Time of Evaluation


Date of Evaluation: 01/10/19


Time of Evaluation: 09:00





- Subjective


Subjective: 





Patient is feeling slightly comfortable.


Cough still present.


Exertional dyspnea noted.


Abdominal distention noted.


Leg swelling improving





Vital signs are stable otherwise.


He is on 4 L via nasal cannula and he is oxygen saturation is 94%.


No bowel movements yet.





84-year-old male with a history of significant heart failure advanced.


Anasarca.


Right leg swelling, deformity.


Stiffness of the knee and hip.


I discussed with the family regarding the prognosis.


Overall prognosis very poor.


We will have a family meeting tomorrow





Objective





- Vital Signs/Intake and Output


Vital Signs (last 24 hours): 


                                        











Temp Pulse Resp BP Pulse Ox


 


 97.5 F L  80   20   101/62   95 


 


 01/13/19 00:00  01/13/19 01:00  01/13/19 00:00  01/13/19 00:00  01/13/19 00:00








Intake and Output: 


                                        











 01/13/19 01/13/19





 06:59 18:59


 


Intake Total 0 


 


Output Total 250 


 


Balance -250 














- Medications


Medications: 


                               Current Medications





Acetaminophen (Tylenol 325mg Tab)  650 mg PO Q6 PRN


   PRN Reason: Pain, moderate (4-7)


Albuterol/Ipratropium (Duoneb 3 Mg/0.5 Mg (3 Ml) Ud)  3 ml INH RQ6 [facility]


   Last Admin: 01/13/19 08:44 Dose:  3 ml


Aspirin (Aspirin Chewable)  81 mg PO DAILY [facility]


   Last Admin: 01/12/19 10:23 Dose:  81 mg


Carvedilol (Coreg)  3.125 mg PO Q12 [facility]


   Last Admin: 01/12/19 21:57 Dose:  3.125 mg


Furosemide (Lasix)  20 mg IVP DAILY [facility]


   Last Admin: 01/12/19 10:23 Dose:  20 mg


Heparin Sodium (Porcine) (Heparin)  5,000 units SC Q8 [facility]


   Last Admin: 01/13/19 06:12 Dose:  5,000 units


Insulin Human Regular (Novolin R)  0 unit SC ACHS [facility]; Protocol


   Last Admin: 01/12/19 21:35 Dose:  Not Given


Lidocaine (Lidoderm)  1 ea TD DAILY [facility]


   Last Admin: 01/12/19 10:23 Dose:  1 ea


Montelukast Sodium (Singulair)  10 mg PO HS [facility]


   Last Admin: 01/12/19 21:57 Dose:  10 mg


Pantoprazole Sodium (Protonix Inj)  40 mg IVP DAILY [facility]


   Last Admin: 01/12/19 10:23 Dose:  40 mg


Rosuvastatin Calcium (Crestor)  2.5 mg PO HS [facility]


   Last Admin: 01/12/19 21:57 Dose:  2.5 mg











- Labs


Labs: 


                                        





                                 01/12/19 16:32 





                                 01/12/19 16:32 





                                        











PT  16.8 SECONDS (9.7-12.2)  H  01/07/19  16:30    


 


INR  1.5   01/07/19  16:30    


 


APTT  34 SECONDS (21-34)   01/07/19  16:30

## 2019-01-13 NOTE — CP.PCM.PN
Subjective





- Date & Time of Evaluation


Date of Evaluation: 01/13/19


Time of Evaluation: 15:05





- Subjective


Subjective: 


Events note


Ascites and Abdominal distention





Objective





- Constitutional


Appears: Non-toxic, No Acute Distress





- Head Exam


Head Exam: ATRAUMATIC, NORMOCEPHALIC





- ENT Exam


ENT Exam: Mucous Membranes Moist





- Respiratory Exam


Respiratory Exam: Rhonchi, Wheezes (expiratory ).  absent: Accessory Muscle Use,

Rales, Respiratory Distress





- Cardiovascular Exam


Cardiovascular Exam: REGULAR RHYTHM, +S1, +S2.  absent: Gallop, Rubs, Murmur





- GI/Abdominal Exam


GI & Abdominal Exam: Distended, Soft.  absent: Rigid, Tenderness





- Extremities Exam


Extremities Exam: Pedal Edema.  absent: Tenderness





- Neurological Exam


Neurological Exam: Alert, Awake, Oriented x3





- Psychiatric Exam


Psychiatric exam: Normal Affect, Normal Mood





- Skin


Skin Exam: Dry, Intact, Normal Color, Warm





Assessment and Plan





- Assessment and Plan (Free Text)


Assessment: 





84 year old male with past medical history of hypertension, diabetes, Dialted 

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 

[place]NP [place] 98834.  Repeat proBNP was 06088. CXR on admission 

showed mild venous congestion and cardiomegaly.  EKG on admission showed paced 

rhythm.  





CHF exacerbation


- Echo from 4/2018 showed 4 chamber dilation with global hypokenesis.  Severe 

diastolic dysfunction.  RV RV systolic function moderately reduced.  Severe 

pulmonary HTN and mild AR, mod MR and severe TR. EF 22%


- Pt states pacemaker was interrogated a few months ago.  


- Continue diuresis with lasixs 40 IVP qd


- Continue home coreg dose at 3.125 mg po Q12


- Continue Aspirin 81 mg po qd


- Will consider placing pt on ACE/ARB or Entresto.    





HTN


- Continue Coreg and Lasixs





Diabetes








Ascites and Abdominal 


Patient awaiting Hospice care





Objective





- Vital Signs/Intake and Output


Vital Signs (last 24 hours): 


                                        











Temp Pulse Resp BP Pulse Ox


 


 97.9 F   80   18   96/62 L  96 


 


 01/13/19 15:00  01/13/19 19:37  01/13/19 15:00  01/13/19 15:00  01/13/19 15:00








Intake and Output: 


                                        











 01/13/19 01/14/19





 18:59 06:59


 


Intake Total 45 


 


Output Total 450 


 


Balance -405 














- Medications


Medications: 


                               Current Medications





Acetaminophen (Tylenol 325mg Tab)  650 mg PO Q6 [facility]


   PRN Reason: Pain, moderate (4-7)


Albuterol/Ipratropium (Duoneb 3 Mg/0.5 Mg (3 Ml) Ud)  3 ml INH RQ6 [facility]


   Last Admin: 01/13/19 19:46 Dose:  3 ml


Aspirin (Aspirin Chewable)  81 mg PO DAILY [facility]


   Last Admin: 01/13/19 11:23 Dose:  Not Given


Carvedilol (Coreg)  3.125 mg PO Q12 [facility]


   Last Admin: 01/13/19 11:23 Dose:  Not Given


Furosemide (Lasix)  20 mg IVP DAILY [facility]


Heparin Sodium (Porcine) (Heparin)  5,000 units SC Q8 [facility]


   Last Admin: 01/13/19 13:59 Dose:  5,000 units


Dextrose/Sodium Chloride (Dextrose 5%/0.45% Ns 1000 Ml)  1,000 mls @ 45 mls/hr 

IV .[id number] [facility]


   Last Admin: 01/13/19 13:52 Dose:  45 mls/hr


Insulin Human Regular (Novolin R)  0 unit SC ACHS [facility]; Protocol


   Last Admin: 01/13/19 16:25 Dose:  Not Given


Lidocaine (Lidoderm)  1 ea TD DAILY [facility]


   Last Admin: 01/13/19 10:59 Dose:  1 ea


Montelukast Sodium (Singulair)  10 mg PO HS [facility]


   Last Admin: 01/12/19 21:57 Dose:  10 mg


Pantoprazole Sodium (Protonix Inj)  40 mg IVP DAILY [facility]


   Last Admin: 01/13/19 11:00 Dose:  40 mg


Rosuvastatin Calcium (Crestor)  2.5 mg PO HS [facility]


   Last Admin: 01/12/19 21:57 Dose:  2.5 mg











- Labs


Labs: 


                                        





                                 01/12/19 16:32 





                                 01/12/19 16:32 





                                        











PT  16.8 SECONDS (9.7-12.2)  H  01/07/19  16:30    


 


INR  1.5   01/07/19  16:30    


 


APTT  34 SECONDS (21-34)   01/07/19  16:30

## 2019-01-14 LAB
ALBUMIN SERPL-MCNC: 3.1 G/DL (ref 3.5–5)
ALBUMIN/GLOB SERPL: 1 {RATIO} (ref 1–2.1)
ALT SERPL-CCNC: 33 U/L (ref 21–72)
AST SERPL-CCNC: 68 U/L (ref 17–59)
BASOPHILS # BLD AUTO: 0 K/UL (ref 0–0.2)
BASOPHILS NFR BLD: 0.2 % (ref 0–2)
BILIRUB UR-MCNC: NEGATIVE MG/DL
BUN SERPL-MCNC: 94 MG/DL (ref 9–20)
CALCIUM SERPL-MCNC: 9.2 MG/DL (ref 8.6–10.4)
EOSINOPHIL # BLD AUTO: 0 K/UL (ref 0–0.7)
EOSINOPHIL NFR BLD: 0.1 % (ref 0–4)
ERYTHROCYTE [DISTWIDTH] IN BLOOD BY AUTOMATED COUNT: 15.4 % (ref 11.5–14.5)
GFR NON-AFRICAN AMERICAN: 39
GLUCOSE UR STRIP-MCNC: NORMAL MG/DL
HGB BLD-MCNC: 12.6 G/DL (ref 12–18)
LEUKOCYTE ESTERASE UR-ACNC: (no result) LEU/UL
LYMPHOCYTE: 3 % (ref 20–40)
LYMPHOCYTES # BLD AUTO: 0.3 K/UL (ref 1–4.3)
LYMPHOCYTES NFR BLD AUTO: 2.4 % (ref 20–40)
MCH RBC QN AUTO: 34.5 PG (ref 27–31)
MCHC RBC AUTO-ENTMCNC: 33.3 G/DL (ref 33–37)
MCV RBC AUTO: 103.6 FL (ref 80–94)
MONOCYTE: 16 % (ref 0–10)
MONOCYTES # BLD: 2.1 K/UL (ref 0–0.8)
MONOCYTES NFR BLD: 14.7 % (ref 0–10)
NEUTROPHILS # BLD: 11.6 K/UL (ref 1.8–7)
NEUTROPHILS NFR BLD AUTO: 81 % (ref 50–75)
NEUTROPHILS NFR BLD AUTO: 82.6 % (ref 50–75)
NRBC BLD AUTO-RTO: 0 % (ref 0–2)
PH UR STRIP: 5 [PH] (ref 5–8)
PLATELET # BLD EST: NORMAL 10*3/UL
PLATELET # BLD: 221 K/UL (ref 130–400)
PMV BLD AUTO: 9.7 FL (ref 7.2–11.7)
PROT UR STRIP-MCNC: NEGATIVE MG/DL
RBC # BLD AUTO: 3.66 MIL/UL (ref 4.4–5.9)
RBC # UR STRIP: NEGATIVE /UL
SP GR UR STRIP: 1.01 (ref 1–1.03)
TOTAL CELLS COUNTED BLD: 100
UROBILINOGEN UR-MCNC: 4 MG/DL (ref 0.2–1)
WBC # BLD AUTO: 14 K/UL (ref 4.8–10.8)

## 2019-01-14 RX ADMIN — IPRATROPIUM BROMIDE AND ALBUTEROL SULFATE SCH ML: .5; 3 SOLUTION RESPIRATORY (INHALATION) at 13:20

## 2019-01-14 RX ADMIN — ROSUVASTATIN CALCIUM SCH MG: 5 TABLET, FILM COATED ORAL at 21:50

## 2019-01-14 RX ADMIN — HUMAN INSULIN SCH: 100 INJECTION, SOLUTION SUBCUTANEOUS at 13:07

## 2019-01-14 RX ADMIN — IPRATROPIUM BROMIDE AND ALBUTEROL SULFATE SCH ML: .5; 3 SOLUTION RESPIRATORY (INHALATION) at 01:58

## 2019-01-14 RX ADMIN — HUMAN INSULIN SCH: 100 INJECTION, SOLUTION SUBCUTANEOUS at 21:57

## 2019-01-14 RX ADMIN — DEXTROSE AND SODIUM CHLORIDE SCH MLS/HR: 5; 450 INJECTION, SOLUTION INTRAVENOUS at 08:17

## 2019-01-14 RX ADMIN — HUMAN INSULIN SCH: 100 INJECTION, SOLUTION SUBCUTANEOUS at 08:18

## 2019-01-14 RX ADMIN — IPRATROPIUM BROMIDE AND ALBUTEROL SULFATE SCH: .5; 3 SOLUTION RESPIRATORY (INHALATION) at 07:52

## 2019-01-14 RX ADMIN — IPRATROPIUM BROMIDE AND ALBUTEROL SULFATE SCH ML: .5; 3 SOLUTION RESPIRATORY (INHALATION) at 19:51

## 2019-01-14 RX ADMIN — HUMAN INSULIN SCH: 100 INJECTION, SOLUTION SUBCUTANEOUS at 17:00

## 2019-01-14 NOTE — RAD
Date of service: 



01/14/2019



PROCEDURE:  CHEST RADIOGRAPH, 1 VIEW



HISTORY:

NGT



COMPARISON:

1/12/2019



FINDINGS:



LUNGS:

Opacity at medial right base, possible pneumonia.  Follow-up advised. 

 Increase in extent compared to the prior examination no other 

abnormal opacity elsewhere.



PLEURA:

No pneumothorax or pleural fluid seen.



CARDIOVASCULAR:

 No aortic atherosclerotic calcification present. Normal heart size.  

Nasogastric tube extends to upper abdomen.  Permanent pacemaker.



OSSEOUS STRUCTURES:

No significant abnormalities.



VISUALIZED UPPER ABDOMEN:

Normal.



OTHER FINDINGS:

None. 



IMPRESSION:

Right basilar opacity.  Possible pneumonia.  Follow-up advised

## 2019-01-14 NOTE — CP.PCM.CON
History of Present Illness





- History of Present Illness


History of Present Illness: 








84 year old male with PMHx of HTN, CAD, and CHF (s/p pacemaker), DM presents to 

ED for complaints of worsening bilateral lower leg swelling that began 1 week 

ago. Patient reports he has been off of his lasix for the past few days because 

he ran out of it but he was able to get the prescription today and he took 3 

pills that he states are 10mg each at 8AM. Patient is also complaining of 

abdominal swelling. On oxygen at home by [place], continuously at night and 

intermittently throughout the day. Last admitted 04/2018 for CHF exacerbation. 

Denies fever, chills, chest pain, SOB, palpitations, abdominal pain, nausea, 

vomiting, dizziness, headache, dizziness, back pain, or any other associated 

symptoms.


During hospital course creat increased from 1.1 to 1.6


Recent cardiac cath revealed global hypokinesis, severe pulmonary HTN


Denies CKD hx








- Medical History


PMH: CAD, CHF, HTN


Surgical History: Pacemaker


Family History: States: No Known Family Hx; no CKD





- Social History


Hx Alcohol Use: Yes


Hx Substance Use: No


Smoking- denies





Review Of Systems


Except As Marked, All Systems Reviewed And Found Negative.


Constitutional: Negative for: Fever, Chills


Eyes: Negative for: Vision Change


ENT: Negative for: Ear Pain, Nose Pain, Nose Congestion, Throat Pain


Cardiovascular: Negative for: Chest Pain, Palpitations


Respiratory: Negative for: Cough, Shortness of Breath, Hemoptysis


Gastrointestinal: Positive for: Other (Abdominal swelling).  Negative for: 

Nausea, Vomiting, Abdominal Pain, Constipation


Genitourinary: Negative for: Dysuria, Frequency


Musculoskeletal: Negative for: Neck Pain, Back Pain


Skin: Positive for: Other (Bilateral lower leg swelling)


Neurological: Negative for: Weakness, Numbness, Altered Mental Status, Headache,

Dizziness











Review of Systems





- Review of Systems


All systems: reviewed and no additional remarkable complaints except





- Constitutional


Constitutional: Fatigue, Weight Gain, Weakness





- EENT


Eyes: absent: As Per HPI, Blind Spots, Blurred Vision, Change in Vision, 

Decreased Night Vision, Diplopia, Discharge, Dry Eye, Exophthalmos, Floaters, 

Irritation, Itchy Eyes, Loss of Peripheral Vision, Pain, Photophobia, Requires 

Corrective Lenses, Sees Flashes, Spots in Vision, Tunnel Vision, Other Visual 

Disturbances, Loss of Vision, Other


Ears: absent: As Per HPI, Decreased Hearing, Ear Discharge, Ear Pain, Tinnitus, 

Abnormal Hearing, Disequilibrium, Dizziness, Other


Nose/Mouth/Throat: absent: As Per HPI, Epistaxis, Nasal Congestion, Nasal 

Discharge, Nasal Obstruction, Nasal Trauma, Nose Pain, Post Nasal Drip, Sinus 

Pain, Sinus Pressure, Bleeding Gums, Change in Voice, Dental Pain, Dry Mouth, 

Dysphagia, Halitosis, Hoarsness, Lip Swelling, Mouth Lesions, Mouth Pain, 

Odynophagia, Sore Throat, Throat Swelling, Tongue Swelling, Facial Pain, Neck 

Pain, Neck Mass, Other





- Cardiovascular


Cardiovascular: Dyspnea on Exertion, Leg Edema





- Respiratory


Respiratory: Cough, Dyspnea on Exertion





- Gastrointestinal


Gastrointestinal: Early Satiety, Nausea





- Genitourinary


Genitourinary: Voiding Freq/Small Amts





- Musculoskeletal


Musculoskeletal: Muscle Weakness, Myalgias





- Integumentary


Integumentary: absent: As Per HPI, Acne, Alopecia, Bleeding Lesions, Change in 

Hair, Change in Nails, Change in Pigmentation, Changing Lesions, Dry Skin, 

Erythema, Furuncle, Hirsutism, Lesions, New Lesions, Non-Healing Lesions, Photo

sensitivity, Pruritus, Rash, Skin Pain, Skin Ulcer, Sores, Striae, Swelling, 

Unusual Bruising, Wounds, Jaundice, Other





- Neurological


Neurological: Weakness





Past Patient History





- Past Medical History & Family History


Past Medical History?: Yes


Past Family History: Reviewed and not pertinent





- Past Social History


Smoking Status: Never Smoked


Chewing Tobacco Use: No


Cigar Use: No


Alcohol: None


Drugs: Denies





- CARDIAC


Hx Congestive Heart Failure: Yes


Hx Hypertension: Yes





- ENDOCRINE/METABOLIC


Hx Diabetes Mellitus Type 2: Yes





- MUSCULOSKELETAL/RHEUMATOLOGICAL


Hx Falls: No





- PSYCHIATRIC


Hx Substance Use: No





- SURGICAL HISTORY


Hx Orthopedic Surgery: Yes (right hip)





- ANESTHESIA


Hx Anesthesia: Yes


Hx Anesthesia Reactions: No





Meds


Allergies/Adverse Reactions: 


                                    Allergies











Allergy/AdvReac Type Severity Reaction Status Date / Time


 


No Known Allergies Allergy   Verified 01/07/19 15:42














- Medications


Medications: 


                               Current Medications





Acetaminophen (Tylenol 325mg Tab)  650 mg PO Q6 PRN


   PRN Reason: Pain, moderate (4-7)


Albuterol/Ipratropium (Duoneb 3 Mg/0.5 Mg (3 Ml) Ud)  3 ml INH RQ6 [facility]


   Last Admin: 01/14/19 07:52 Dose:  Not Given


Aspirin (Aspirin Chewable)  81 mg PO DAILY [facility]


   Last Admin: 01/14/19 10:09 Dose:  81 mg


Carvedilol (Coreg)  3.125 mg PO Q12 [facility]


   Last Admin: 01/14/19 10:11 Dose:  Not Given


Furosemide (Lasix)  20 mg IVP DAILY [facility]


   Last Admin: 01/14/19 10:10 Dose:  Not Given


Heparin Sodium (Porcine) (Heparin)  5,000 units SC Q8 [facility]


   Last Admin: 01/14/19 06:27 Dose:  5,000 units


Dextrose/Sodium Chloride (Dextrose 5%/0.45% Ns 1000 Ml)  1,000 mls @ 45 mls/hr 

IV .[id number] [facility]


   Last Admin: 01/14/19 08:17 Dose:  45 mls/hr


Insulin Human Regular (Novolin R)  0 unit SC ACHS [facility]; Protocol


   Last Admin: 01/14/19 08:18 Dose:  Not Given


Lidocaine (Lidoderm)  1 ea TD DAILY [facility]


   Last Admin: 01/14/19 10:10 Dose:  1 ea


Montelukast Sodium (Singulair)  10 mg PO HS [facility]


   Last Admin: 01/13/19 21:45 Dose:  Not Given


Pantoprazole Sodium (Protonix Inj)  40 mg IVP DAILY [facility]


   Last Admin: 01/14/19 10:09 Dose:  40 mg


Rosuvastatin Calcium (Crestor)  2.5 mg PO [facility]


   Last Admin: 01/13/19 21:44 Dose:  Not Given











Physical Exam





- Constitutional


Appears: In Acute Distress, Confused, Chronically Ill





- Head Exam


Head Exam: ATRAUMATIC, NORMAL INSPECTION





- Eye Exam


Eye Exam: EOMI, Normal appearance





- Neck Exam


Neck exam: Positive for: Normal Inspection.  Negative for: Tenderness





- Respiratory Exam


Respiratory Exam: Rhonchi, Wheezes, Respiratory Distress





- Cardiovascular Exam


Cardiovascular Exam: REGULAR RHYTHM, +S1, Systolic Murmur





- GI/Abdominal Exam


GI & Abdominal Exam: Distended, Soft.  absent: Tenderness





- Extremities Exam


Extremities exam: Positive for: pedal edema.  Negative for: tenderness





- Neurological Exam


Neurological exam: Altered, CN II-XII Intact





- Skin


Skin Exam: Dry, Warm





Results





- Vital Signs


Recent Vital Signs: 


                                Last Vital Signs











Temp  97.6 F   01/13/19 23:20


 


Pulse  80   01/14/19 04:00


 


Resp  20   01/13/19 23:20


 


BP  93/57 L  01/14/19 10:10


 


Pulse Ox  99   01/13/19 23:20














- Labs


Result Diagrams: 


                                 01/12/19 16:32





                                 01/12/19 16:32


Labs: 


                         Laboratory Results - last 24 hr











  01/13/19 01/13/19 01/14/19





  16:18 21:31 06:41


 


POC Glucose (mg/dL)  95  97  107














Assessment & Plan


(1) Pulmonary HTN


Status: Acute   





(2) Chronic kidney disease, stage III (moderate)


Status: Acute   





(3) Cardiorenal syndrome


Status: Acute   





(4) CHF exacerbation


Status: Acute   





- Assessment and Plan (Free Text)


Plan: 





increase IV lasix


decrease IV fluids


check urine na


serial chemistries


can consider inotropes

## 2019-01-15 LAB
ALBUMIN SERPL-MCNC: 3.3 G/DL (ref 3.5–5)
ALBUMIN/GLOB SERPL: 1 {RATIO} (ref 1–2.1)
ALT SERPL-CCNC: 33 U/L (ref 21–72)
APPEARANCE FLD: (no result)
AST SERPL-CCNC: 37 U/L (ref 17–59)
BODY FLD TYPE: (no result)
BODY FLUID MONO/MACROPHAGE: 4 % (ref 0–0)
BUN SERPL-MCNC: 94 MG/DL (ref 9–20)
CALCIUM SERPL-MCNC: 9.4 MG/DL (ref 8.6–10.4)
ERYTHROCYTE [DISTWIDTH] IN BLOOD BY AUTOMATED COUNT: 15.4 % (ref 11.5–14.5)
GFR NON-AFRICAN AMERICAN: 39
HGB BLD-MCNC: 13.2 G/DL (ref 12–18)
MCH RBC QN AUTO: 34.2 PG (ref 27–31)
MCHC RBC AUTO-ENTMCNC: 33 G/DL (ref 33–37)
MCV RBC AUTO: 103.9 FL (ref 80–94)
PLATELET # BLD: 236 K/UL (ref 130–400)
PMV BLD AUTO: 9.4 FL (ref 7.2–11.7)
RBC # BLD AUTO: 3.85 MIL/UL (ref 4.4–5.9)
RBC # FLD AUTO: 2340 /MM3 (ref 0–0)
WBC # BLD AUTO: 13.3 K/UL (ref 4.8–10.8)
WBC # FLD: 290 /MM3 (ref 0–300)

## 2019-01-15 PROCEDURE — BW40ZZZ ULTRASONOGRAPHY OF ABDOMEN: ICD-10-PCS | Performed by: RADIOLOGY

## 2019-01-15 PROCEDURE — 0W9G3ZZ DRAINAGE OF PERITONEAL CAVITY, PERCUTANEOUS APPROACH: ICD-10-PCS | Performed by: RADIOLOGY

## 2019-01-15 RX ADMIN — IPRATROPIUM BROMIDE AND ALBUTEROL SULFATE SCH ML: .5; 3 SOLUTION RESPIRATORY (INHALATION) at 01:28

## 2019-01-15 RX ADMIN — HUMAN INSULIN SCH: 100 INJECTION, SOLUTION SUBCUTANEOUS at 22:29

## 2019-01-15 RX ADMIN — ROSUVASTATIN CALCIUM SCH MG: 5 TABLET, FILM COATED ORAL at 21:29

## 2019-01-15 RX ADMIN — IPRATROPIUM BROMIDE AND ALBUTEROL SULFATE SCH ML: .5; 3 SOLUTION RESPIRATORY (INHALATION) at 08:11

## 2019-01-15 RX ADMIN — HUMAN INSULIN SCH: 100 INJECTION, SOLUTION SUBCUTANEOUS at 12:47

## 2019-01-15 RX ADMIN — IPRATROPIUM BROMIDE AND ALBUTEROL SULFATE SCH ML: .5; 3 SOLUTION RESPIRATORY (INHALATION) at 20:48

## 2019-01-15 RX ADMIN — IPRATROPIUM BROMIDE AND ALBUTEROL SULFATE SCH: .5; 3 SOLUTION RESPIRATORY (INHALATION) at 14:04

## 2019-01-15 RX ADMIN — HUMAN INSULIN SCH: 100 INJECTION, SOLUTION SUBCUTANEOUS at 16:53

## 2019-01-15 RX ADMIN — HUMAN INSULIN SCH: 100 INJECTION, SOLUTION SUBCUTANEOUS at 08:48

## 2019-01-15 NOTE — CP.PCM.PN
Subjective





- Date & Time of Evaluation


Date of Evaluation: 01/14/19


Time of Evaluation: 21:50





- Subjective


Subjective: 





Patient seen and evaluated


No new events noted





Ascites and Abdominal distention





Objective





- Constitutional


Appears: Non-toxic, No Acute Distress





- Head Exam


Head Exam: ATRAUMATIC, NORMOCEPHALIC





- ENT Exam


ENT Exam: Mucous Membranes Moist





- Respiratory Exam


Respiratory Exam: Rhonchi, Wheezes (expiratory ).  absent: Accessory Muscle Use,

Rales, Respiratory Distress





- Cardiovascular Exam


Cardiovascular Exam: REGULAR RHYTHM, +S1, +S2.  absent: Gallop, Rubs, Murmur





- GI/Abdominal Exam


GI & Abdominal Exam: Distended, Soft.  absent: Rigid, Tenderness





- Extremities Exam


Extremities Exam: Pedal Edema.  absent: Tenderness





- Neurological Exam


Neurological Exam: Alert, Awake, Oriented x3





- Psychiatric Exam


Psychiatric exam: Normal Affect, Normal Mood





- Skin


Skin Exam: Dry, Intact, Normal Color, Warm





Assessment and Plan





- Assessment and Plan (Free Text)


Assessment: 





84 year old male with past medical history of hypertension, diabetes, Dialted 

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 

exacerbation [place] of 76[address].  Repeat proBNP was 36297. CXR on admission 

showed mild venous congestion and cardiomegaly.  EKG on admission showed paced 

rhythm.  





CHF exacerbation/End stage Cardiomyopathy


- Echo from 4/2018 showed 4 chamber dilation with global hypokenesis.  Severe 

diastolic dysfunction.  RV RV systolic function moderately reduced.  Severe 

pulmonary HTN and mild AR, mod MR and severe TR. EF 22%


- Pt states pacemaker was interrogated a few months ago.  


- Continue diuresis with lasixs 40 IVP qd


- Continue home coreg dose at 3.125 mg po Q12


- Continue Aspirin 81 mg po qd


- Will consider placing pt on ACE/ARB or Entresto.    





HTN


- Continue Coreg and Lasixs





Diabetes








Ascites and Abdominal 


Patient awaiting Hospice care








Objective





- Vital Signs/Intake and Output


Vital Signs (last 24 hours): 


                                        











Temp Pulse Resp BP Pulse Ox


 


 97.5 F L  83   18   94/61 L  97 


 


 01/15/19 04:31  01/15/19 04:31  01/15/19 04:31  01/15/19 04:31  01/15/19 04:31











- Medications


Medications: 


                               Current Medications





Acetaminophen (Tylenol 325mg Tab)  650 mg PO Q6 PRN


   PRN Reason: Pain, moderate (4-7)


Albuterol/Ipratropium (Duoneb 3 Mg/0.5 Mg (3 Ml) Ud)  3 ml INH RQ6 [facility]


   Last Admin: 01/15/19 08:11 Dose:  3 ml


Aspirin (Aspirin Chewable)  81 mg PO DAILY [facility]


   Last Admin: 01/14/19 10:09 Dose:  81 mg


Carvedilol (Coreg)  3.125 mg PO DAILY [facility]


Furosemide (Lasix)  40 mg IVP BID [facility]


   Last Admin: 01/14/19 21:51 Dose:  40 mg


Heparin Sodium (Porcine) (Heparin)  5,000 units SC Q8 [facility]


   Last Admin: 01/15/19 06:53 Dose:  5,000 units


Insulin Human Regular (Novolin R)  0 unit SC ACHS [facility]; Protocol


   Last Admin: 01/14/19 21:57 Dose:  Not Given


Lidocaine (Lidoderm)  1 ea TD DAILY [facility]


   Last Admin: 01/14/19 10:10 Dose:  1 ea


Montelukast Sodium (Singulair)  10 mg PO HS [facility]


   Last Admin: 01/14/19 21:50 Dose:  10 mg


Pantoprazole Sodium (Protonix Inj)  40 mg IVP DAILY [facility]


   Last Admin: 01/14/19 10:09 Dose:  40 mg


Rosuvastatin Calcium (Crestor)  2.5 mg PO HS [facility]


   Last Admin: 01/14/19 21:50 Dose:  2.5 mg











- Labs


Labs: 


                                        





                                 01/14/19 11:29 





                                 01/14/19 11:29 





                                        











PT  16.8 SECONDS (9.7-12.2)  H  01/07/19  16:30    


 


INR  1.5   01/07/19  16:30    


 


APTT  34 SECONDS (21-34)   01/07/19  16:30

## 2019-01-15 NOTE — CP.PCM.PN
Subjective





- Date & Time of Evaluation


Date of Evaluation: 01/15/19


Time of Evaluation: 10:39





- Subjective


Subjective: 





unable to examine, undergoing paracentesis


plan for hospice noted





Objective





- Vital Signs/Intake and Output


Vital Signs (last 24 hours): 


                                        











Temp Pulse Resp BP Pulse Ox


 


 97.3 F L  84   19   99/69 L  96 


 


 01/15/19 08:00  01/15/19 08:00  01/15/19 08:00  01/15/19 08:00  01/15/19 08:00











- Medications


Medications: 


                               Current Medications





Acetaminophen (Tylenol 325mg Tab)  650 mg PO Q6 PRN


   PRN Reason: Pain, moderate (4-7)


Albuterol/Ipratropium (Duoneb 3 Mg/0.5 Mg (3 Ml) Ud)  3 ml INH RQ6 [facility]


   Last Admin: 01/15/19 08:11 Dose:  3 ml


Aspirin (Aspirin Chewable)  81 mg PO DAILY [facility]


   Last Admin: 01/14/19 10:09 Dose:  81 mg


Carvedilol (Coreg)  3.125 mg PO DAILY [facility]


Furosemide (Lasix)  40 mg IVP BID [facility]


   Last Admin: 01/14/19 21:51 Dose:  40 mg


Heparin Sodium (Porcine) (Heparin)  5,000 units SC Q8 [facility]


   Last Admin: 01/15/19 06:53 Dose:  5,000 units


Insulin Human Regular (Novolin R)  0 unit SC ACHS [facility]; Protocol


   Last Admin: 01/15/19 08:48 Dose:  Not Given


Lidocaine (Lidoderm)  1 ea TD DAILY [facility]


   Last Admin: 01/14/19 10:10 Dose:  1 ea


Montelukast Sodium (Singulair)  10 mg PO HS [facility]


   Last Admin: 01/14/19 21:50 Dose:  10 mg


Pantoprazole Sodium (Protonix Inj)  40 mg IVP DAILY [facility]


   Last Admin: 01/14/19 10:09 Dose:  40 mg


Rosuvastatin Calcium (Crestor)  2.5 mg PO HS [facility]


   Last Admin: 01/14/19 21:50 Dose:  2.5 mg











- Labs


Labs: 


                                        





                                 01/15/19 09:30 





                                 01/15/19 09:30 





                                        











PT  16.8 SECONDS (9.7-12.2)  H  01/07/19  16:30    


 


INR  1.5   01/07/19  16:30    


 


APTT  34 SECONDS (21-34)   01/07/19  16:30    














Assessment and Plan


(1) CHF exacerbation


Status: Acute   





(2) Cardiorenal syndrome


Status: Acute   





(3) Pulmonary HTN


Status: Acute   





(4) CAD (coronary atherosclerotic disease)


Status: Acute   





(5) Pedal edema


Status: Acute   





- Assessment and Plan (Free Text)


Assessment: 





maintain diuretics


clinical picture suggestive of cardiorenal syndrome / deo. conservative renal 

management. 


meds reviewed, avoid nephrotoxic meds





agree w/ hospice

## 2019-01-15 NOTE — PCM.SURG1
Surgeon's Initial Post Op Note





- Surgeon's Notes


Surgeon: Ulises Miranda MD


Assistant: NONE


Type of Anesthesia: Local


Pre-Operative Diagnosis: Ascites


Operative Findings: US showed small amount of ascites


Post-Operative Diagnosis: Ascites


Operation Performed: Paracentesis


Specimen/Specimens Removed: 2.1 liters of straw colored fluid


Estimated Blood Loss: EBL {In ML}: 0


Blood Products Given: N/A


Drains Used: No Drains


Post-Op Condition: Fair


Date of Surgery/Procedure: 01/15/19


Time of Surgery/Procedure: 11:40

## 2019-01-15 NOTE — CP.PCM.PN
<Awa Mc - Last Filed: 01/15/19 14:35>





Subjective





- Date & Time of Evaluation


Date of Evaluation: 01/15/19


Time of Evaluation: 09:00





- Subjective


Subjective: 





Cardiology Progress Note:





Patient was seen and examined at bedside.  Patient states he is feeling well and

denies complaints.  Patient states he cannot wait to get out of the hospital as 

he does not want to celebrate his birthday here.  Denies having any chest pain, 

shortness of breath, fever, chills, nausea, vomiting, diarrhea or constipation. 





Objective





- Vital Signs/Intake and Output


Vital Signs (last 24 hours): 


                                        











Temp Pulse Resp BP Pulse Ox


 


 97.3 F L  84   19   92/56 L  96 


 


 01/15/19 08:00  01/15/19 08:00  01/15/19 08:00  01/15/19 12:46  01/15/19 08:00











- Medications


Medications: 


                               Current Medications





Acetaminophen (Tylenol 325mg Tab)  650 mg PO Q6 PRN


   PRN Reason: Pain, moderate (4-7)


Albuterol/Ipratropium (Duoneb 3 Mg/0.5 Mg (3 Ml) Ud)  3 ml INH RQ6 [facility]


   Last Admin: 01/15/19 14:04 Dose:  Not Given


Aspirin (Aspirin Chewable)  81 mg PO DAILY [facility]


   Last Admin: 01/15/19 12:45 Dose:  81 mg


Carvedilol (Coreg)  3.125 mg PO DAILY [facility]


   Last Admin: 01/15/19 12:46 Dose:  Not Given


Furosemide (Lasix)  40 mg IVP BID [facility]


   Last Admin: 01/15/19 12:46 Dose:  Not Given


Heparin Sodium (Porcine) (Heparin)  5,000 units SC Q8 [facility]


   Last Admin: 01/15/19 13:14 Dose:  5,000 units


Insulin Human Regular (Novolin R)  0 unit SC ACHS [facility]; Protocol


   Last Admin: 01/15/19 12:47 Dose:  Not Given


Lidocaine (Lidoderm)  1 ea TD DAILY [facility]


   Last Admin: 01/15/19 12:45 Dose:  1 ea


Montelukast Sodium (Singulair)  10 mg PO HS [facility]


   Last Admin: 01/14/19 21:50 Dose:  10 mg


Pantoprazole Sodium (Protonix Inj)  40 mg IVP DAILY [facility]


   Last Admin: 01/15/19 12:46 Dose:  Not Given


Rosuvastatin Calcium (Crestor)  2.5 mg PO HS [facility]


   Last Admin: 01/14/19 21:50 Dose:  2.5 mg











- Labs


Labs: 


                                        





                                 01/15/19 09:30 





                                 01/15/19 09:30 





                                        











PT  16.8 SECONDS (9.7-12.2)  H  01/07/19  16:30    


 


INR  1.5   01/07/19  16:30    


 


APTT  34 SECONDS (21-34)   01/07/19  16:30    














- Constitutional


Appears: No Acute Distress, Chronically Ill





- Head Exam


Head Exam: ATRAUMATIC, NORMAL INSPECTION





- Eye Exam


Eye Exam: EOMI, Normal appearance





- ENT Exam


ENT Exam: Mucous Membranes Moist





- Cardiovascular Exam


Cardiovascular Exam: REGULAR RHYTHM, +S1, +S2





- Extremities Exam


Extremities Exam: Pedal Edema (+1 bilateral)





- Neurological Exam


Neurological Exam: Alert, Awake, Oriented x3





- Psychiatric Exam


Psychiatric exam: Normal Affect





- Skin


Skin Exam: Normal Color





Assessment and Plan





- Assessment and Plan (Free Text)


Assessment: 





84 year old male with past medical history of hypertension, diabetes, Dialted 

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 

exacerbation [place]BNP of 829[address].  Repeat proBNP was 92630. CXR on admission 

showed mild venous congestion and cardiomegaly.  EKG on admission showed paced 

rhythm.  





CHF exacerbation/End stage Cardiomyopathy


- Echo from 4/2018 showed 4 chamber dilation with global hypokenesis.  Severe 

diastolic dysfunction.  RV RV systolic function moderately reduced.  Severe 

pulmonary HTN and mild AR, mod MR and severe TR. EF 22%


- Pt states pacemaker was interrogated a few months ago.  


- Continue diuresis with lasixs 40 IVP qd


- Continue home coreg dose at 3.125 mg po Q12


- Continue Aspirin 81 mg po qd


- Will consider placing pt on ACE/ARB or Entresto.    





HTN


- Continue Coreg 3.125mg po daily


- Continue Lasix 40mg bid





Diabetes


- Continue Crestor 2.5 mg po hs


- Aspirin 81mg daily 





Ascites and Abdominal 


Patient awaiting rehab placement 





Case discussed with Dr. George Mc PGY-2








<Familia Vazquez - Last Filed: 01/16/19 21:12>





Objective





- Vital Signs/Intake and Output


Vital Signs (last 24 hours): 


                                        











Temp Pulse Resp BP Pulse Ox


 


 98.1 F   80   18   102/66   98 


 


 01/16/19 15:00  01/16/19 15:00  01/16/19 15:00  01/16/19 15:00  01/16/19 15:00











- Labs


Labs: 


                                        





                                 01/16/19 08:31 





                                 01/16/19 08:31 





                                        











PT  16.8 SECONDS (9.7-12.2)  H  01/07/19  16:30    


 


INR  1.5   01/07/19  16:30    


 


APTT  34 SECONDS (21-34)   01/07/19  16:30    














Assessment and Plan





- Assessment and Plan (Free Text)


Assessment: 


Patient examined and evaluated personally by me. Plan of care d/w the medical 

resident and as documented

## 2019-01-15 NOTE — RAD
Date of service: 



01/15/2019



HISTORY:

 ilieus 



COMPARISON:

1/13/2019



FINDINGS:



BOWEL:

The prior gastric distension is no longer present.  Currently the 

small and large bowel loops are within normal limits in terms of 

their caliber.  Moderate stool retention present especially right 

colon.  Currently no bowel obstruction or significant appearing ileus 

is suggested.



BONES:

Thoraco lumbar spondylosis.  RF left hip total arthroplasty-partially 

visualized.



Right hip arthrosis.



OTHER FINDINGS:

None.



IMPRESSION:

Interval decompression of the gastric distension.  Currently no ileus 

or large bowel obstruction suggested.



Other findings as above.

## 2019-01-15 NOTE — CP.PCM.PN
Subjective





- Date & Time of Evaluation


Date of Evaluation: 01/15/19


Time of Evaluation: 23:16





- Subjective


Subjective: 





Patient today this morning underwent abdominal paracentesis.


2 L of fluid removed.


Fluid analysis was sent.


Patient was otherwise well.


I spoke to the patient's next of kin.


Explained to the family that the patient will be possibly discharged to rehab.





On examination:


Vital signs otherwise stable.


Mild exertional dyspnea noted.


Chest bilateral wheezing minimally noted.


Abdomen soft otherwise.


Edema in the legs noted.





Assessment and recommendation:


84-year-old male with a history of advanced osteoarthritis.


Advanced heart disease.


Cardiomyopathy.


Anasarca.


The renal insufficiency.


Osteoarthritis.


Worsening at this time.


Possible discharge plan once a bed is available.


Recommended physical therapy.


Family thinking about hospice.


We will follow the patient





Objective





- Vital Signs/Intake and Output


Vital Signs (last 24 hours): 


                                        











Temp Pulse Resp BP Pulse Ox


 


 97.2 F L  86   18   91/68 L  96 


 


 01/15/19 15:00  01/15/19 18:00  01/15/19 15:00  01/15/19 21:38  01/15/19 15:00











- Medications


Medications: 


                               Current Medications





Acetaminophen (Tylenol 325mg Tab)  650 mg PO Q6 PRN


   PRN Reason: Pain, moderate (4-7)


Albuterol/Ipratropium (Duoneb 3 Mg/0.5 Mg (3 Ml) Ud)  3 ml INH RQ6 [facility]


   Last Admin: 01/15/19 20:48 Dose:  3 ml


Aspirin (Aspirin Chewable)  81 mg PO DAILY [facility]


   Last Admin: 01/15/19 12:45 Dose:  81 mg


Carvedilol (Coreg)  3.125 mg PO DAILY [facility]


   Last Admin: 01/15/19 12:46 Dose:  Not Given


Furosemide (Lasix)  40 mg IVP BID [facility]


   Last Admin: 01/15/19 21:38 Dose:  Not Given


Heparin Sodium (Porcine) (Heparin)  5,000 units SC Q8 [facility]


   Last Admin: 01/15/19 21:29 Dose:  5,000 units


Insulin Human Regular (Novolin R)  0 unit SC ACHS [facility]; Protocol


   Last Admin: 01/15/19 22:29 Dose:  Not Given


Lidocaine (Lidoderm)  1 ea TD DAILY [facility]


   Last Admin: 01/15/19 12:45 Dose:  1 ea


Montelukast Sodium (Singulair)  10 mg PO HS [facility]


   Last Admin: 01/15/19 21:29 Dose:  10 mg


Pantoprazole Sodium (Protonix Ec Tab)  40 mg PO DAILY [facility]


Rosuvastatin Calcium (Crestor)  2.5 mg PO HS [facility]


   Last Admin: 01/15/19 21:29 Dose:  2.5 mg











- Labs


Labs: 


                                        





                                 01/15/19 09:30 





                                 01/15/19 09:30 





                                        











PT  16.8 SECONDS (9.7-12.2)  H  01/07/19  16:30    


 


INR  1.5   01/07/19  16:30    


 


APTT  34 SECONDS (21-34)   01/07/19  16:30

## 2019-01-16 VITALS — RESPIRATION RATE: 18 BRPM

## 2019-01-16 VITALS
TEMPERATURE: 98.1 F | HEART RATE: 80 BPM | DIASTOLIC BLOOD PRESSURE: 66 MMHG | OXYGEN SATURATION: 98 % | SYSTOLIC BLOOD PRESSURE: 102 MMHG

## 2019-01-16 LAB
ALBUMIN SERPL-MCNC: 3 G/DL (ref 3.5–5)
ALBUMIN/GLOB SERPL: 0.9 {RATIO} (ref 1–2.1)
ALT SERPL-CCNC: 26 U/L (ref 21–72)
AST SERPL-CCNC: 35 U/L (ref 17–59)
BASOPHILS # BLD AUTO: 0 K/UL (ref 0–0.2)
BASOPHILS NFR BLD: 0.2 % (ref 0–2)
BUN SERPL-MCNC: 91 MG/DL (ref 9–20)
CALCIUM SERPL-MCNC: 9.1 MG/DL (ref 8.6–10.4)
EOSINOPHIL # BLD AUTO: 0 K/UL (ref 0–0.7)
EOSINOPHIL NFR BLD: 0.2 % (ref 0–4)
ERYTHROCYTE [DISTWIDTH] IN BLOOD BY AUTOMATED COUNT: 15.3 % (ref 11.5–14.5)
GFR NON-AFRICAN AMERICAN: 48
HGB BLD-MCNC: 13.5 G/DL (ref 12–18)
LYMPHOCYTE: 6 % (ref 20–40)
LYMPHOCYTES # BLD AUTO: 0.5 K/UL (ref 1–4.3)
LYMPHOCYTES NFR BLD AUTO: 3.6 % (ref 20–40)
MCH RBC QN AUTO: 34.5 PG (ref 27–31)
MCHC RBC AUTO-ENTMCNC: 33.4 G/DL (ref 33–37)
MCV RBC AUTO: 103.2 FL (ref 80–94)
MONOCYTE: 14 % (ref 0–10)
MONOCYTES # BLD: 1.9 K/UL (ref 0–0.8)
MONOCYTES NFR BLD: 13.5 % (ref 0–10)
NEUTROPHILS # BLD: 11.4 K/UL (ref 1.8–7)
NEUTROPHILS NFR BLD AUTO: 80 % (ref 50–75)
NEUTROPHILS NFR BLD AUTO: 82.5 % (ref 50–75)
NRBC BLD AUTO-RTO: 0 % (ref 0–2)
PLATELET # BLD EST: NORMAL 10*3/UL
PLATELET # BLD: 247 K/UL (ref 130–400)
PMV BLD AUTO: 9.3 FL (ref 7.2–11.7)
RBC # BLD AUTO: 3.91 MIL/UL (ref 4.4–5.9)
TOTAL CELLS COUNTED BLD: 100
URATE SERPL-MCNC: 15.2 MG/DL (ref 3.5–8.5)
WBC # BLD AUTO: 13.8 K/UL (ref 4.8–10.8)

## 2019-01-16 RX ADMIN — IPRATROPIUM BROMIDE AND ALBUTEROL SULFATE SCH ML: .5; 3 SOLUTION RESPIRATORY (INHALATION) at 07:14

## 2019-01-16 RX ADMIN — HUMAN INSULIN SCH: 100 INJECTION, SOLUTION SUBCUTANEOUS at 17:14

## 2019-01-16 RX ADMIN — HUMAN INSULIN SCH: 100 INJECTION, SOLUTION SUBCUTANEOUS at 07:24

## 2019-01-16 RX ADMIN — IPRATROPIUM BROMIDE AND ALBUTEROL SULFATE SCH: .5; 3 SOLUTION RESPIRATORY (INHALATION) at 13:23

## 2019-01-16 RX ADMIN — IPRATROPIUM BROMIDE AND ALBUTEROL SULFATE SCH ML: .5; 3 SOLUTION RESPIRATORY (INHALATION) at 01:55

## 2019-01-16 RX ADMIN — HUMAN INSULIN SCH: 100 INJECTION, SOLUTION SUBCUTANEOUS at 12:05

## 2019-01-16 NOTE — US
Date of Procedure: 



PROCEDURE: Ultrasound-guided paracentesis, CPT 32626



Medications: 7 cc 1% Lidocaine







HISTORY:

Ascites, abdominal pain,



TECHNIQUE:





Following informed consent , the patient was placed supine on the 

stretcher and the site was marked. A limited abdominal ultrasound was 

performed that showed a small amount of intra-abdominal fluid. 

Procedural time out was called and the Pt's abdomen was marked and 

prepped and draped in the usual sterile fashion.  Ultrasound-guided  

paracentesis performed.  A total of 1 liter of straw colored fluid 

was removed without complication. 



IMPRESSION:





Ultrasound-guided paracentesis.

## 2019-01-16 NOTE — CP.PCM.PN
Subjective





- Date & Time of Evaluation


Date of Evaluation: 01/16/19


Time of Evaluation: 16:56





Objective





- Vital Signs/Intake and Output


Vital Signs (last 24 hours): 


                                        











Temp Pulse Resp BP Pulse Ox


 


 98.1 F   80   18   102/66   98 


 


 01/16/19 15:00  01/16/19 15:00  01/16/19 15:00  01/16/19 15:00  01/16/19 15:00











- Medications


Medications: 


                               Current Medications





Acetaminophen (Tylenol 325mg Tab)  650 mg PO Q6 PRN


   PRN Reason: Pain, moderate (4-7)


Albuterol/Ipratropium (Duoneb 3 Mg/0.5 Mg (3 Ml) Ud)  3 ml INH RQ6 [facility]


   Last Admin: 01/16/19 13:23 Dose:  Not Given


Aspirin (Aspirin Chewable)  81 mg PO DAILY [facility]


   Last Admin: 01/16/19 09:32 Dose:  81 mg


Carvedilol (Coreg)  3.125 mg PO DAILY [facility]


   Last Admin: 01/16/19 09:33 Dose:  3.125 mg


Furosemide (Lasix)  40 mg IVP BID [facility]


   Last Admin: 01/16/19 09:33 Dose:  40 mg


Heparin Sodium (Porcine) (Heparin)  5,000 units SC Q8 [facility]


   Last Admin: 01/16/19 14:43 Dose:  5,000 units


Insulin Human Regular (Novolin R)  0 unit SC ACHS [facility]; Protocol


   Last Admin: 01/16/19 12:05 Dose:  Not Given


Lidocaine (Lidoderm)  1 ea TD DAILY [facility]


   Last Admin: 01/16/19 09:33 Dose:  1 ea


Montelukast Sodium (Singulair)  10 mg PO HS [facility]


   Last Admin: 01/15/19 21:29 Dose:  10 mg


Pantoprazole Sodium (Protonix Ec Tab)  40 mg PO DAILY [facility]


   Last Admin: 01/16/19 09:33 Dose:  40 mg


Rosuvastatin Calcium (Crestor)  2.5 mg PO HS [facility]


   Last Admin: 01/15/19 21:29 Dose:  2.5 mg











- Labs


Labs: 


                                        





                                 01/16/19 08:31 





                                 01/16/19 08:31 





                                        











PT  16.8 SECONDS (9.7-12.2)  H  01/07/19  16:30    


 


INR  1.5   01/07/19  16:30    


 


APTT  34 SECONDS (21-34)   01/07/19  16:30    














Assessment and Plan





- Assessment and Plan (Free Text)


Assessment: 





PLACE UNDER THE SERVICE OF DR TREVIZO AT [facility] ----CALL FOR ADMITTING 

ORDER


CONTINUE MEDICATION AS PER MED REC


ACTIVITY AS TOLERATED AND FACILITY PROTOCOL


CALL DR TREVIZO OR GO TO THE EMERGENCY ROOM IF SYMPTOM RETURN OR WORSENING


MAKE ARRANGE TO F/U WITH DR MCDONALD AND DR LINARES AS OUT PATIENT

## 2019-01-16 NOTE — CP.PCM.PN
Subjective





- Date & Time of Evaluation


Date of Evaluation: 01/16/19


Time of Evaluation: 13:50





- Subjective


Subjective: 





s/p thoracentesis- 2100ml


remains very pre-renal


less dyspneic


UO not recorded


on IV lasix





Objective





- Vital Signs/Intake and Output


Vital Signs (last 24 hours): 


                                        











Temp Pulse Resp BP Pulse Ox


 


 97.4 F L  85   18   106/70   95 


 


 01/16/19 07:00  01/16/19 07:00  01/16/19 07:00  01/16/19 09:33  01/16/19 07:00











- Medications


Medications: 


                               Current Medications





Acetaminophen (Tylenol 325mg Tab)  650 mg PO Q6 PRN


   PRN Reason: Pain, moderate (4-7)


Albuterol/Ipratropium (Duoneb 3 Mg/0.5 Mg (3 Ml) Ud)  3 ml INH RQ6 [facility]


   Last Admin: 01/16/19 13:23 Dose:  Not Given


Aspirin (Aspirin Chewable)  81 mg PO DAILY [facility]


   Last Admin: 01/16/19 09:32 Dose:  81 mg


Carvedilol (Coreg)  3.125 mg PO DAILY [facility]


   Last Admin: 01/16/19 09:33 Dose:  3.125 mg


Furosemide (Lasix)  40 mg IVP BID [facility]


   Last Admin: 01/16/19 09:33 Dose:  40 mg


Heparin Sodium (Porcine) (Heparin)  5,000 units SC Q8 [facility]


   Last Admin: 01/16/19 06:00 Dose:  5,000 units


Insulin Human Regular (Novolin R)  0 unit SC ACHS [facility]; Protocol


   Last Admin: 01/16/19 12:05 Dose:  Not Given


Lidocaine (Lidoderm)  1 ea TD DAILY [facility]


   Last Admin: 01/16/19 09:33 Dose:  1 ea


Montelukast Sodium (Singulair)  10 mg PO HS [facility]


   Last Admin: 01/15/19 21:29 Dose:  10 mg


Pantoprazole Sodium (Protonix Ec Tab)  40 mg PO DAILY [facility]


   Last Admin: 01/16/19 09:33 Dose:  40 mg


Rosuvastatin Calcium (Crestor)  2.5 mg PO HS [facility]


   Last Admin: 01/15/19 21:29 Dose:  2.5 mg











- Labs


Labs: 


                                        





                                 01/16/19 08:31 





                                 01/16/19 08:31 





                                        











PT  16.8 SECONDS (9.7-12.2)  H  01/07/19  16:30    


 


INR  1.5   01/07/19  16:30    


 


APTT  34 SECONDS (21-34)   01/07/19  16:30    














- Constitutional


Appears: No Acute Distress, Chronically Ill





- Head Exam


Head Exam: ATRAUMATIC, NORMAL INSPECTION





- Eye Exam


Eye Exam: EOMI, Normal appearance





- Neck Exam


Neck Exam: Normal Inspection.  absent: Full ROM





- Respiratory Exam


Respiratory Exam: Rhonchi, NORMAL BREATHING PATTERN





- Cardiovascular Exam


Cardiovascular Exam: REGULAR RHYTHM, +S1





- GI/Abdominal Exam


GI & Abdominal Exam: Distended, Soft





- Extremities Exam


Extremities Exam: Pedal Edema.  absent: Tenderness





- Neurological Exam


Neurological Exam: Awake, CN II-XII Intact





- Skin


Skin Exam: Dry, Warm





Assessment and Plan


(1) Pulmonary HTN


Status: Acute   





(2) Chronic kidney disease, stage III (moderate)


Status: Acute   





(3) Cardiorenal syndrome


Status: Acute   





(4) CHF exacerbation


Status: Acute   





- Assessment and Plan (Free Text)


Plan: 





IV lasix


repeat chemistries


hospice noted

## 2019-01-16 NOTE — CP.PCM.PN
<Awa Mc - Last Filed: 01/16/19 14:06>





Subjective





- Date & Time of Evaluation


Date of Evaluation: 01/16/19


Time of Evaluation: 11:00





- Subjective


Subjective: 





Cardiology Progress Note:





Patient was seen and examined at bedside.  Patient states he is feeling well and

denies complaints.  Denies having any chest pain, shortness of breath, fever, 

chills, nausea, vomiting, diarrhea or constipation. 





Objective





- Vital Signs/Intake and Output


Vital Signs (last 24 hours): 


                                        











Temp Pulse Resp BP Pulse Ox


 


 97.4 F L  85   18   106/70   95 


 


 01/16/19 07:00  01/16/19 07:00  01/16/19 07:00  01/16/19 09:33  01/16/19 07:00











- Medications


Medications: 


                               Current Medications





Acetaminophen (Tylenol 325mg Tab)  650 mg PO Q6 PRN


   PRN Reason: Pain, moderate (4-7)


Albuterol/Ipratropium (Duoneb 3 Mg/0.5 Mg (3 Ml) Ud)  3 ml INH RQ6 [facility]


   Last Admin: 01/16/19 07:14 Dose:  3 ml


Aspirin (Aspirin Chewable)  81 mg PO DAILY [facility]


   Last Admin: 01/16/19 09:32 Dose:  81 mg


Carvedilol (Coreg)  3.125 mg PO DAILY [facility]


   Last Admin: 01/16/19 09:33 Dose:  3.125 mg


Furosemide (Lasix)  40 mg IVP BID [facility]


   Last Admin: 01/16/19 09:33 Dose:  40 mg


Heparin Sodium (Porcine) (Heparin)  5,000 units SC Q8 [facility]


   Last Admin: 01/16/19 06:00 Dose:  5,000 units


Insulin Human Regular (Novolin R)  0 unit SC ACHS [facility]; Protocol


   Last Admin: 01/16/19 07:24 Dose:  Not Given


Lidocaine (Lidoderm)  1 ea TD DAILY [facility]


   Last Admin: 01/16/19 09:33 Dose:  1 ea


Montelukast Sodium (Singulair)  10 mg PO HS [facility]


   Last Admin: 01/15/19 21:29 Dose:  10 mg


Pantoprazole Sodium (Protonix Ec Tab)  40 mg PO DAILY [facility]


   Last Admin: 01/16/19 09:33 Dose:  40 mg


Rosuvastatin Calcium (Crestor)  2.5 mg PO HS [facility]


   Last Admin: 01/15/19 21:29 Dose:  2.5 mg











- Labs


Labs: 


                                        





                                 01/16/19 08:31 





                                 01/16/19 08:31 





                                        











PT  16.8 SECONDS (9.7-12.2)  H  01/07/19  16:30    


 


INR  1.5   01/07/19  16:30    


 


APTT  34 SECONDS (21-34)   01/07/19  16:30    














- Constitutional


Appears: No Acute Distress, Chronically Ill





- Head Exam


Head Exam: ATRAUMATIC, NORMAL INSPECTION





- Eye Exam


Eye Exam: Normal appearance





- ENT Exam


ENT Exam: Mucous Membranes Moist





- Respiratory Exam


Respiratory Exam: NORMAL BREATHING PATTERN





- Cardiovascular Exam


Cardiovascular Exam: REGULAR RHYTHM, +S1, +S2





- GI/Abdominal Exam


GI & Abdominal Exam: Normal Bowel Sounds





- Extremities Exam


Extremities Exam: Pedal Edema





- Neurological Exam


Neurological Exam: Alert, Awake, Oriented x3





- Psychiatric Exam


Psychiatric exam: Normal Affect





Assessment and Plan





- Assessment and Plan (Free Text)


Assessment: 





84 year old male with past medical history of hypertension, diabetes, Dialted 

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

[place]NP [place] 37[address].  Repeat proBNP was 47445. CXR on admission 

showed mild venous congestion and cardiomegaly.  EKG on admission showed paced 

rhythm.  





CHF exacerbation/End stage Cardiomyopathy


- Echo from 4/2018 showed 4 chamber dilation with global hypokenesis.  Severe 

diastolic dysfunction.  RV RV systolic function moderately reduced.  Severe 

pulmonary HTN and mild AR, mod MR and severe TR. EF 22%


- Pt states pacemaker was interrogated a few months ago.  


- Continue diuresis with lasixs 40 IVP qd


- Continue home coreg dose at 3.125 mg po Q12


- Continue Aspirin 81 mg po qd


- Will consider placing pt on ACE/ARB or Entresto.    





HTN


- Continue Coreg 3.125mg po daily


- Continue Lasix 40mg bid





Diabetes


- Continue Crestor 2.5 mg po hs


- Aspirin 81mg daily 





Ascites and Abdominal 


Patient awaiting rehab placement 





Thank you for this consult.  Please reconsult as needed. Thank you. 





Case discussed with Dr. George Mc PGY-2








<Familia Vazquez - Last Filed: 01/16/19 21:13>





Objective





- Vital Signs/Intake and Output


Vital Signs (last 24 hours): 


                                        











Temp Pulse Resp BP Pulse Ox


 


 98.1 F   80   18   102/66   98 


 


 01/16/19 15:00  01/16/19 15:00  01/16/19 15:00  01/16/19 15:00  01/16/19 15:00











- Labs


Labs: 


                                        





                                 01/16/19 08:31 





                                 01/16/19 08:31 





                                        











PT  16.8 SECONDS (9.7-12.2)  H  01/07/19  16:30    


 


INR  1.5   01/07/19  16:30    


 


APTT  34 SECONDS (21-34)   01/07/19  16:30    














Assessment and Plan





- Assessment and Plan (Free Text)


Assessment: 


Patient examined and evaluated personally by me. Plan of care d/w the medical 

resident and as documented

## 2020-09-16 NOTE — CP.PCM.PN
Subjective





- Date & Time of Evaluation


Date of Evaluation: 01/14/19


Time of Evaluation: 21:57





- Subjective


Subjective: 





The patient's family was informed about the overall condition.


Family is agreeing for hospice and subacute rehab.


Awaiting for placement at this time.


Today patient is slightly better than yesterday.


He pulled out NG tube early morning today.


He was complaining of some feeling thirsty.


Started the.  Diet.


Still having some abdominal distention.


Patient has ascites from the CAT scan


We will possibly get a opinion from interventional radiology for paracentesis, 

if the fluid is large enough.


Continue diuretics.


I appreciate the nephrology evaluation.





Patient is a 84-year-old male with a severe systolic heart failure.


Pulmonary hypertension.


Associated with ascites.


Pleural effusion.


Pedal edema








Objective





- Vital Signs/Intake and Output


Vital Signs (last 24 hours): 


                                        











Temp Pulse Resp BP Pulse Ox


 


 97.7 F   77   18   135/70   97 


 


 01/14/19 15:00  01/14/19 15:00  01/14/19 15:00  01/14/19 21:51  01/14/19 15:00








Intake and Output: 


                                        











 01/14/19 01/15/19





 18:59 06:59


 


Intake Total 225 


 


Balance 225 














- Medications


Medications: 


                               Current Medications





Acetaminophen (Tylenol 325mg Tab)  650 mg PO Q6 PRN


   PRN Reason: Pain, moderate (4-7)


Albuterol/Ipratropium (Duoneb 3 Mg/0.5 Mg (3 Ml) Ud)  3 ml INH RQ6 [facility]


   Last Admin: 01/14/19 19:51 Dose:  3 ml


Aspirin (Aspirin Chewable)  81 mg PO DAILY [facility]


   Last Admin: 01/14/19 10:09 Dose:  81 mg


Carvedilol (Coreg)  3.125 mg PO DAILY [facility]


Furosemide (Lasix)  40 mg IVP BID [facility]


   Last Admin: 01/14/19 21:51 Dose:  40 mg


Heparin Sodium (Porcine) (Heparin)  5,000 units SC Q8 [facility]


   Last Admin: 01/14/19 21:50 Dose:  5,000 units


Insulin Human Regular (Novolin R)  0 unit SC ACHS [facility]; Protocol


   Last Admin: 01/14/19 21:57 Dose:  Not Given


Lidocaine (Lidoderm)  1 ea TD DAILY [facility]


   Last Admin: 01/14/19 10:10 Dose:  1 ea


Montelukast Sodium (Singulair)  10 mg PO HS [facility]


   Last Admin: 01/14/19 21:50 Dose:  10 mg


Pantoprazole Sodium (Protonix Inj)  40 mg IVP DAILY [facility]


   Last Admin: 01/14/19 10:09 Dose:  40 mg


Rosuvastatin Calcium (Crestor)  2.5 mg PO HS [facility]


   Last Admin: 01/14/19 21:50 Dose:  2.5 mg











- Labs


Labs: 


                                        





                                 01/14/19 11:29 





                                 01/14/19 11:29 





                                        











PT  16.8 SECONDS (9.7-12.2)  H  01/07/19  16:30    


 


INR  1.5   01/07/19  16:30    


 


APTT  34 SECONDS (21-34)   01/07/19  16:30 [FreeTextEntry1] : Patient was seen and examined with Dr. Carvalho\par Patient report feeling good\par \par Device interrogation showed normal single chamber  ICD parameter\par No event, no shock.\par Patient on coreg, entresto and spironolactone\par \par Patient refused remote monitoring\par \par Next office follow up in 6 months\par